# Patient Record
Sex: MALE | Race: OTHER | NOT HISPANIC OR LATINO | URBAN - METROPOLITAN AREA
[De-identification: names, ages, dates, MRNs, and addresses within clinical notes are randomized per-mention and may not be internally consistent; named-entity substitution may affect disease eponyms.]

---

## 2018-01-28 ENCOUNTER — EMERGENCY (EMERGENCY)
Facility: HOSPITAL | Age: 63
LOS: 1 days | Discharge: HOME | End: 2018-01-28
Attending: EMERGENCY MEDICINE
Payer: MEDICAID

## 2018-01-28 VITALS
DIASTOLIC BLOOD PRESSURE: 82 MMHG | SYSTOLIC BLOOD PRESSURE: 200 MMHG | HEART RATE: 93 BPM | TEMPERATURE: 98 F | RESPIRATION RATE: 18 BRPM | OXYGEN SATURATION: 100 %

## 2018-01-28 VITALS — DIASTOLIC BLOOD PRESSURE: 71 MMHG | SYSTOLIC BLOOD PRESSURE: 191 MMHG

## 2018-01-28 PROCEDURE — 99284 EMERGENCY DEPT VISIT MOD MDM: CPT

## 2018-01-28 PROCEDURE — 90471 IMMUNIZATION ADMIN: CPT

## 2018-01-28 PROCEDURE — 90715 TDAP VACCINE 7 YRS/> IM: CPT

## 2018-01-28 PROCEDURE — 99283 EMERGENCY DEPT VISIT LOW MDM: CPT | Mod: 25

## 2018-01-28 RX ORDER — TETANUS TOXOID, REDUCED DIPHTHERIA TOXOID AND ACELLULAR PERTUSSIS VACCINE, ADSORBED 5; 2.5; 8; 8; 2.5 [IU]/.5ML; [IU]/.5ML; UG/.5ML; UG/.5ML; UG/.5ML
0.5 SUSPENSION INTRAMUSCULAR ONCE
Qty: 0 | Refills: 0 | Status: COMPLETED | OUTPATIENT
Start: 2018-01-28 | End: 2018-01-28

## 2018-01-28 RX ORDER — BACITRACIN ZINC 500 UNIT/G
1 OINTMENT IN PACKET (EA) TOPICAL ONCE
Qty: 0 | Refills: 0 | Status: COMPLETED | OUTPATIENT
Start: 2018-01-28 | End: 2018-01-28

## 2018-01-28 RX ADMIN — Medication 1 APPLICATION(S): at 19:58

## 2018-01-28 RX ADMIN — TETANUS TOXOID, REDUCED DIPHTHERIA TOXOID AND ACELLULAR PERTUSSIS VACCINE, ADSORBED 0.5 MILLILITER(S): 5; 2.5; 8; 8; 2.5 SUSPENSION INTRAMUSCULAR at 19:58

## 2018-01-28 RX ADMIN — Medication 1 TABLET(S): at 19:58

## 2018-01-28 NOTE — ED PROVIDER NOTE - ATTENDING CONTRIBUTION TO CARE
63 y/o M pt presents with dog bite superficial wound. Well-appearing, neurovascularly intact, no signs of infection. Plan for tetanus immunization, prophylactic Abx, and d/c home. No need for rabies vaccination. Pt will f/u with PMD in x2-3 days.

## 2018-01-28 NOTE — ED PROVIDER NOTE - PROGRESS NOTE DETAILS
Dog bite/abrasion to L posterior calf. Will update tetanus immunization. No need for rabies vaccination. Given history of DM and LE affected will give 5 day course of prophylactic Augmentin. Will need to follow up with PMD in 2-3 days. Pt is well appearing walking with steady gait, stable for discharge and follow up without fail with medical doctor. I had a detailed discussion with the patient and/or guardian regarding the historical points, exam findings, and any diagnostic results supporting the discharge diagnosis. Pt educated on care and need for follow up. Strict return instructions and red flag signs and symptoms discussed with patient. Questions answered. Pt shows understanding of discharge information and agrees to follow.

## 2018-01-28 NOTE — ED PROVIDER NOTE - OBJECTIVE STATEMENT
63 y/o M pt with PMHx of HTN and DM and no significant PSHx presents with son to ED c/o mild pain to the L posterior calf s/p dog bite earlier today. Pt states he was bitten on the L calf by his neighbor's [employer] Retriever; pt states neighbor's dog is well-appearing, with neighbors informing the pt that their dog is UTD with its vaccinations. Pt states the dog bite was unprovoked. In ED, pt notes bleeding is controlled, however pt is still c/o mild pain to the L posterior calf. Pt denies any other complaints. Pt reports unknown last tetanus vaccination. NKDA.

## 2019-02-21 PROBLEM — Z00.00 ENCOUNTER FOR PREVENTIVE HEALTH EXAMINATION: Noted: 2019-02-21

## 2019-02-27 ENCOUNTER — APPOINTMENT (OUTPATIENT)
Dept: THORACIC SURGERY | Facility: CLINIC | Age: 64
End: 2019-02-27
Payer: MEDICAID

## 2019-02-27 VITALS
BODY MASS INDEX: 22.13 KG/M2 | HEIGHT: 67 IN | DIASTOLIC BLOOD PRESSURE: 73 MMHG | OXYGEN SATURATION: 100 % | HEART RATE: 64 BPM | SYSTOLIC BLOOD PRESSURE: 123 MMHG | TEMPERATURE: 97.8 F | WEIGHT: 141 LBS

## 2019-02-27 DIAGNOSIS — I77.9 DISORDER OF ARTERIES AND ARTERIOLES, UNSPECIFIED: ICD-10-CM

## 2019-02-27 DIAGNOSIS — Z78.9 OTHER SPECIFIED HEALTH STATUS: ICD-10-CM

## 2019-02-27 PROCEDURE — 99204 OFFICE O/P NEW MOD 45 MIN: CPT

## 2019-02-27 NOTE — ASSESSMENT
[FreeTextEntry1] : 63 year old male with Right lung consolidation on CT of January 10th 2019.  He has since completed a course of antibiotics.  I recommend a repeat non-contrast chest CT.  RTO after CT.\par \par Written by  Perla Sanchez PA-C acting as a scribe for Az Mijares MD. The documentation recorded by the scribe accurately reflects the service I personally performed and the decisions made by me, AZ MIJARES MD.\par

## 2019-02-27 NOTE — HISTORY OF PRESENT ILLNESS
[Hypertension] : Hypertension [FreeTextEntry1] : 63 year old male, never smoker, with h/o HTN, stents x4 (most recent 2016), on Brilenta and ASA, referred by Dr. Uribe for evaluation of right lung consolidation.  Patient reports that he had cough 2 months ago, was started on ABX, had a CXR which showed an abnormality then a CT scan that confirmed a RLL consolidation.  Patient has since been treated with ABX.  He denies hemoptysis.  He denies unexplained weight loss.  Medial[employer] InterpretThe [employer] was used for translation.\par  [Diabetes Mellitus] : no Diabetes Melllitus

## 2019-02-27 NOTE — DATA REVIEWED
[FreeTextEntry1] : CT Chest done at [facility] 1/10/19  \par Report scanned: Patchy airspace consolidation Right lower lobe may reflect pneumonia in the appropriate clinical setting.  Follow up resolution s suggested in 4-6 weeks post treatment to exclude underlying mass.\par \par I also reviewed the images of this CT through [facility] Portal

## 2019-02-27 NOTE — PHYSICAL EXAM
[General Appearance - Alert] : alert [General Appearance - In No Acute Distress] : in no acute distress [Sclera] : the sclera and conjunctiva were normal [Extraocular Movements] : extraocular movements were intact [Outer Ear] : the ears and nose were normal in appearance [Neck Appearance] : the appearance of the neck was normal [Neck Cervical Mass (___cm)] : no neck mass was observed [] : no respiratory distress [Auscultation Breath Sounds / Voice Sounds] : lungs were clear to auscultation bilaterally [Heart Rate And Rhythm] : heart rate was normal and rhythm regular [Heart Sounds] : normal S1 and S2 [Examination Of The Chest] : the chest was normal in appearance [2+] : left 2+ [Fingers] :  capillary refill of the fingers was normal [Left Fingers] :  capillary refill of the left fingers was normal [Abdomen Soft] : soft [Abdomen Tenderness] : non-tender [Cervical Lymph Nodes Enlarged Posterior Bilaterally] : posterior cervical [Cervical Lymph Nodes Enlarged Anterior Bilaterally] : anterior cervical [Supraclavicular Lymph Nodes Enlarged Bilaterally] : supraclavicular [No CVA Tenderness] : no ~M costovertebral angle tenderness [Abnormal Walk] : normal gait [Skin Color & Pigmentation] : normal skin color and pigmentation [No Focal Deficits] : no focal deficits [Oriented To Time, Place, And Person] : oriented to person, place, and time

## 2019-02-27 NOTE — REVIEW OF SYSTEMS
[Fever] : no fever [Chills] : no chills [Chest Pain] : no chest pain [Palpitations] : no palpitations [Shortness Of Breath] : no shortness of breath [Cough] : cough [Abdominal Pain] : no abdominal pain [Negative] : Endocrine [FreeTextEntry6] : improving

## 2019-03-20 ENCOUNTER — APPOINTMENT (OUTPATIENT)
Dept: THORACIC SURGERY | Facility: CLINIC | Age: 64
End: 2019-03-20
Payer: MEDICARE

## 2019-03-20 VITALS
WEIGHT: 141 LBS | SYSTOLIC BLOOD PRESSURE: 126 MMHG | BODY MASS INDEX: 22.13 KG/M2 | HEIGHT: 67 IN | TEMPERATURE: 98.6 F | HEART RATE: 66 BPM | DIASTOLIC BLOOD PRESSURE: 71 MMHG

## 2019-03-20 DIAGNOSIS — Z82.49 FAMILY HISTORY OF ISCHEMIC HEART DISEASE AND OTHER DISEASES OF THE CIRCULATORY SYSTEM: ICD-10-CM

## 2019-03-20 DIAGNOSIS — Z86.39 PERSONAL HISTORY OF OTHER ENDOCRINE, NUTRITIONAL AND METABOLIC DISEASE: ICD-10-CM

## 2019-03-20 DIAGNOSIS — J45.909 UNSPECIFIED ASTHMA, UNCOMPLICATED: ICD-10-CM

## 2019-03-20 PROCEDURE — 99215 OFFICE O/P EST HI 40 MIN: CPT

## 2019-03-20 NOTE — ADDENDUM
[FreeTextEntry1] : Discussed case with MIRLANDE Siddiqi at Dr. Harper's office who stated patient's last PCI was May 21, 2018. She stated that we would be able to hold Brilinta 5-7 days before the procedure - lung biopsy and resume in 1-2 days following the procedure barring no complications.

## 2019-03-20 NOTE — REASON FOR VISIT
[Follow-Up: _____] : a [unfilled] follow-up visit [FreeTextEntry1] : He was seen by us 3 weeks ago, and a repeat CT without contrast was recommended to evaluate any change in mass post ABX.

## 2019-03-20 NOTE — PHYSICAL EXAM
[Sclera] : the sclera and conjunctiva were normal [Extraocular Movements] : extraocular movements were intact [Neck Appearance] : the appearance of the neck was normal [Neck Cervical Mass (___cm)] : no neck mass was observed [] : no respiratory distress [Auscultation Breath Sounds / Voice Sounds] : lungs were clear to auscultation bilaterally [Heart Rate And Rhythm] : heart rate was normal and rhythm regular [Heart Sounds] : normal S1 and S2 [Examination Of The Chest] : the chest was normal in appearance [2+] : left 2+ [Abdomen Soft] : soft [Abdomen Tenderness] : non-tender [Cervical Lymph Nodes Enlarged Posterior Bilaterally] : posterior cervical [Supraclavicular Lymph Nodes Enlarged Bilaterally] : supraclavicular [Cervical Lymph Nodes Enlarged Anterior Bilaterally] : anterior cervical [Abnormal Walk] : normal gait [No CVA Tenderness] : no ~M costovertebral angle tenderness [Skin Color & Pigmentation] : normal skin color and pigmentation [No Focal Deficits] : no focal deficits [Oriented To Time, Place, And Person] : oriented to person, place, and time [General Appearance - Alert] : alert [General Appearance - In No Acute Distress] : in no acute distress [Outer Ear] : the ears and nose were normal in appearance [Fingers] :  capillary refill of the fingers was normal [Left Fingers] :  capillary refill of the left fingers was normal

## 2019-03-20 NOTE — REVIEW OF SYSTEMS
[Cough] : cough [Negative] : Psychiatric [Fever] : no fever [Chills] : no chills [Palpitations] : no palpitations [Chest Pain] : no chest pain [Shortness Of Breath] : no shortness of breath [FreeTextEntry6] : improving [Abdominal Pain] : no abdominal pain

## 2019-03-20 NOTE — DATA REVIEWED
[FreeTextEntry1] : CT Chest done at [facility] 1/10/19  \par Report scanned: Patchy airspace consolidation Right lower lobe may reflect pneumonia in the appropriate clinical setting.  Follow up resolution s suggested in 4-6 weeks post treatment to exclude underlying mass.\par \par CT done March 2019 at [facility] shows persistent mass, 4cm.

## 2019-03-20 NOTE — ASSESSMENT
[FreeTextEntry1] : Persistent RLL mass.  Recommend IR biopsy.  Patient is on Brilenta and ASA for h/o stent 2016.  He will need medical and cardiac clearance as well as PFT's. I called his cardiologist and left message for call back.  Also called Dr. Uribe.  Awaiting call backs.   Also left message for Dr. Ramos (IR).  Will contact patient when we hear from Dr. Ramos.  If Dr. Ramos is agreeable, we will schedule IR bx.  Patient is scheduled to see Dr. Uribe this afternoon, I gave him the clearance form for Dr. Uribe to complete.\par \par Cardiologist is Dr. Harper: [phone]\par PMD:  Dr. Uribe  [phone]\par \par Best contact for patient is:[phone]\par

## 2019-03-20 NOTE — HISTORY OF PRESENT ILLNESS
[Dyslipidemia] : Dyslipidemia [Hypertension] : Hypertension [FreeTextEntry1] : 63 year old male, never smoker, with h/o HTN, stents x4 (most recent 2016), on Brilenta and ASA, referred by Dr. Uribe for evaluation of right lung consolidation. We saw patient 3 weeks ago and recommended a repeat CT scan to evaluate any change in RLL consolidation post ABX treatment.  He presents today with no new complaints, he had hi repeat CT at [facility] and is here to review the results, and plan.\par  [Diabetes Mellitus] : no Diabetes Melllitus

## 2019-03-28 ENCOUNTER — OUTPATIENT (OUTPATIENT)
Dept: OUTPATIENT SERVICES | Facility: HOSPITAL | Age: 64
LOS: 1 days | End: 2019-03-28

## 2019-03-28 DIAGNOSIS — R91.8 OTHER NONSPECIFIC ABNORMAL FINDING OF LUNG FIELD: ICD-10-CM

## 2019-03-28 PROBLEM — E11.9 TYPE 2 DIABETES MELLITUS WITHOUT COMPLICATIONS: Chronic | Status: ACTIVE | Noted: 2018-01-28

## 2019-03-28 PROBLEM — I10 ESSENTIAL (PRIMARY) HYPERTENSION: Chronic | Status: ACTIVE | Noted: 2018-01-28

## 2019-03-29 PROBLEM — Z00.00 ENCOUNTER FOR PREVENTIVE HEALTH EXAMINATION: Noted: 2019-03-29

## 2019-04-01 ENCOUNTER — APPOINTMENT (OUTPATIENT)
Dept: INTERVENTIONAL RADIOLOGY/VASCULAR | Facility: HOSPITAL | Age: 64
End: 2019-04-01

## 2019-05-20 ENCOUNTER — APPOINTMENT (OUTPATIENT)
Dept: INTERVENTIONAL RADIOLOGY/VASCULAR | Facility: HOSPITAL | Age: 64
End: 2019-05-20
Payer: MEDICARE

## 2019-05-20 ENCOUNTER — OUTPATIENT (OUTPATIENT)
Dept: OUTPATIENT SERVICES | Facility: HOSPITAL | Age: 64
LOS: 1 days | End: 2019-05-20

## 2019-05-20 DIAGNOSIS — R91.8 OTHER NONSPECIFIC ABNORMAL FINDING OF LUNG FIELD: ICD-10-CM

## 2019-05-20 PROCEDURE — 99204 OFFICE O/P NEW MOD 45 MIN: CPT

## 2019-05-20 NOTE — REASON FOR VISIT
[Consultation] : a consultation visit [[employer] ] : provided by [employer]   [FreeTextEntry1] : [id number]

## 2019-05-20 NOTE — REVIEW OF SYSTEMS
[As noted in HPI] : as noted in HPI [As Noted in HPI] : as noted in HPI [Negative] : Neurological [Chest Pain] : no chest pain [Shortness Of Breath] : no shortness of breath [Cough] : no cough

## 2019-05-20 NOTE — ASSESSMENT
[FreeTextEntry1] : 64 yo M who presents for consultation for Right lung mass biopsy. Patient has a persistent RLL pulmonary mass that has failed antibiotic therapy. Patient has CAD s/p stents x4 (last stent over a year ago), and last cardiac cath with angioplasty in April 2019. Patient is currently on ASA, Brilinta, and Cilostazol, which he states he was told by his doctor is okay to stop if needed prior to biopsy. The procedure (CT guided biopsy of right lung mass) was discussed in detail with the patient using the telephone  service. The benefits, alternatives, and risks of the procedure, including but not limited to risks of bleeding and pneumothorax which may require chest tube placement was also discussed with the patient. All questions were answered. Patient demonstrates understanding and states that he will schedule the appointment after he "analyzes" how he should proceed. Patient was told that should he decide to proceed with the procedure, he would have to hold the above mentioned medications accordingly, if cleared by cardiologist.

## 2019-05-20 NOTE — HISTORY OF PRESENT ILLNESS
[Stable] : stable [None] : No exacerbating factors are noted [FreeTextEntry1] : Mr. Bear is a 63 year old male who presents for consultation for Right lung mass biopsy. Patient has history of Asthma, HTN, cardiac stents x 4 on Brilinta, ASA, and Cilostazol. Last stent placed over a year ago. However, patient reports having cardiac angiogram with angioplasty at [facility] this year in April (confirmed with cardiology NP). Patient presented to PCP several months ago with cough, at which time imaging showed RLL consolidation. Patient had a course of antibiotics, but repeat imaging failed to demonstrate resolution of the opacity. Patient presented to thoracic surgery service, after which he was referred to IR for percutaneous biopsy. Patient reports previous history of right elbow and left neck lesions biopsy, which yielded benign results. Patient denies chest pain, SOB, cough, night sweats, fever, chills at this time. Denies nausea, vomiting, diarrhea, or recent weight loss.  \par \par Cardiologist Dr. Harper [phone]\par  [de-identified] : see report

## 2019-05-20 NOTE — PHYSICAL EXAM
[Alert] : alert [No Acute Distress] : no acute distress [No Proptosis] : no proptosis [Normal Hearing] : hearing was normal [No Respiratory Distress] : no respiratory distress [Normal Rate and Effort] : normal respiratory rhythm and effort [Normal Gait] : normal gait [Oriented x3] : oriented to person, place, and time

## 2020-03-10 ENCOUNTER — APPOINTMENT (OUTPATIENT)
Dept: THORACIC SURGERY | Facility: CLINIC | Age: 65
End: 2020-03-10

## 2022-01-26 ENCOUNTER — INPATIENT (INPATIENT)
Facility: HOSPITAL | Age: 67
LOS: 1 days | Discharge: HOME | DRG: 65 | End: 2022-01-28
Attending: INTERNAL MEDICINE | Admitting: INTERNAL MEDICINE
Payer: COMMERCIAL

## 2022-01-26 VITALS
SYSTOLIC BLOOD PRESSURE: 206 MMHG | HEART RATE: 70 BPM | OXYGEN SATURATION: 100 % | WEIGHT: 180.78 LBS | HEIGHT: 65 IN | DIASTOLIC BLOOD PRESSURE: 83 MMHG | RESPIRATION RATE: 20 BRPM | TEMPERATURE: 98 F

## 2022-01-26 DIAGNOSIS — E78.5 HYPERLIPIDEMIA, UNSPECIFIED: ICD-10-CM

## 2022-01-26 DIAGNOSIS — N17.9 ACUTE KIDNEY FAILURE, UNSPECIFIED: ICD-10-CM

## 2022-01-26 DIAGNOSIS — I25.10 ATHEROSCLEROTIC HEART DISEASE OF NATIVE CORONARY ARTERY WITHOUT ANGINA PECTORIS: ICD-10-CM

## 2022-01-26 DIAGNOSIS — I10 ESSENTIAL (PRIMARY) HYPERTENSION: ICD-10-CM

## 2022-01-26 DIAGNOSIS — Z29.9 ENCOUNTER FOR PROPHYLACTIC MEASURES, UNSPECIFIED: ICD-10-CM

## 2022-01-26 DIAGNOSIS — I63.89 OTHER CEREBRAL INFARCTION: ICD-10-CM

## 2022-01-26 DIAGNOSIS — I63.9 CEREBRAL INFARCTION, UNSPECIFIED: ICD-10-CM

## 2022-01-26 LAB
ACETONE SERPL-MCNC: NEGATIVE — SIGNIFICANT CHANGE UP
ALBUMIN SERPL ELPH-MCNC: 3 G/DL — LOW (ref 3.5–5)
ALP SERPL-CCNC: 120 U/L — SIGNIFICANT CHANGE UP (ref 40–120)
ALT FLD-CCNC: 14 U/L DA — SIGNIFICANT CHANGE UP (ref 10–60)
ANION GAP SERPL CALC-SCNC: 5 MMOL/L — SIGNIFICANT CHANGE UP (ref 5–17)
APTT BLD: 34.7 SEC — SIGNIFICANT CHANGE UP (ref 27.5–35.5)
AST SERPL-CCNC: 11 U/L — SIGNIFICANT CHANGE UP (ref 10–40)
BASOPHILS # BLD AUTO: 0.04 K/UL — SIGNIFICANT CHANGE UP (ref 0–0.2)
BASOPHILS NFR BLD AUTO: 0.4 % — SIGNIFICANT CHANGE UP (ref 0–2)
BILIRUB SERPL-MCNC: 0.4 MG/DL — SIGNIFICANT CHANGE UP (ref 0.2–1.2)
BLD GP AB SCN SERPL QL: SIGNIFICANT CHANGE UP
BUN SERPL-MCNC: 21 MG/DL — HIGH (ref 7–18)
CALCIUM SERPL-MCNC: 8.9 MG/DL — SIGNIFICANT CHANGE UP (ref 8.4–10.5)
CHLORIDE SERPL-SCNC: 101 MMOL/L — SIGNIFICANT CHANGE UP (ref 96–108)
CHOLEST SERPL-MCNC: 152 MG/DL — SIGNIFICANT CHANGE UP
CO2 SERPL-SCNC: 30 MMOL/L — SIGNIFICANT CHANGE UP (ref 22–31)
CREAT SERPL-MCNC: 1.31 MG/DL — HIGH (ref 0.5–1.3)
EOSINOPHIL # BLD AUTO: 0.3 K/UL — SIGNIFICANT CHANGE UP (ref 0–0.5)
EOSINOPHIL NFR BLD AUTO: 2.9 % — SIGNIFICANT CHANGE UP (ref 0–6)
GLUCOSE BLDC GLUCOMTR-MCNC: 337 MG/DL — HIGH (ref 70–99)
GLUCOSE SERPL-MCNC: 280 MG/DL — HIGH (ref 70–99)
HCT VFR BLD CALC: 40.7 % — SIGNIFICANT CHANGE UP (ref 39–50)
HDLC SERPL-MCNC: 37 MG/DL — LOW
HGB BLD-MCNC: 14.3 G/DL — SIGNIFICANT CHANGE UP (ref 13–17)
IMM GRANULOCYTES NFR BLD AUTO: 0.4 % — SIGNIFICANT CHANGE UP (ref 0–1.5)
INR BLD: 1.01 RATIO — SIGNIFICANT CHANGE UP (ref 0.88–1.16)
LACTATE SERPL-SCNC: 1.8 MMOL/L — SIGNIFICANT CHANGE UP (ref 0.7–2)
LIPID PNL WITH DIRECT LDL SERPL: 61 MG/DL — SIGNIFICANT CHANGE UP
LYMPHOCYTES # BLD AUTO: 1.63 K/UL — SIGNIFICANT CHANGE UP (ref 1–3.3)
LYMPHOCYTES # BLD AUTO: 15.7 % — SIGNIFICANT CHANGE UP (ref 13–44)
MAGNESIUM SERPL-MCNC: 2.2 MG/DL — SIGNIFICANT CHANGE UP (ref 1.6–2.6)
MCHC RBC-ENTMCNC: 29.7 PG — SIGNIFICANT CHANGE UP (ref 27–34)
MCHC RBC-ENTMCNC: 35.1 GM/DL — SIGNIFICANT CHANGE UP (ref 32–36)
MCV RBC AUTO: 84.4 FL — SIGNIFICANT CHANGE UP (ref 80–100)
MONOCYTES # BLD AUTO: 0.31 K/UL — SIGNIFICANT CHANGE UP (ref 0–0.9)
MONOCYTES NFR BLD AUTO: 3 % — SIGNIFICANT CHANGE UP (ref 2–14)
NEUTROPHILS # BLD AUTO: 8.08 K/UL — HIGH (ref 1.8–7.4)
NEUTROPHILS NFR BLD AUTO: 77.6 % — HIGH (ref 43–77)
NON HDL CHOLESTEROL: 115 MG/DL — SIGNIFICANT CHANGE UP
NRBC # BLD: 0 /100 WBCS — SIGNIFICANT CHANGE UP (ref 0–0)
PLATELET # BLD AUTO: 219 K/UL — SIGNIFICANT CHANGE UP (ref 150–400)
POTASSIUM SERPL-MCNC: 4 MMOL/L — SIGNIFICANT CHANGE UP (ref 3.5–5.3)
POTASSIUM SERPL-SCNC: 4 MMOL/L — SIGNIFICANT CHANGE UP (ref 3.5–5.3)
PROT SERPL-MCNC: 7 G/DL — SIGNIFICANT CHANGE UP (ref 6–8.3)
PROTHROM AB SERPL-ACNC: 12 SEC — SIGNIFICANT CHANGE UP (ref 10.6–13.6)
RBC # BLD: 4.82 M/UL — SIGNIFICANT CHANGE UP (ref 4.2–5.8)
RBC # FLD: 11.6 % — SIGNIFICANT CHANGE UP (ref 10.3–14.5)
SARS-COV-2 RNA SPEC QL NAA+PROBE: SIGNIFICANT CHANGE UP
SODIUM SERPL-SCNC: 136 MMOL/L — SIGNIFICANT CHANGE UP (ref 135–145)
TRIGL SERPL-MCNC: 268 MG/DL — HIGH
TROPONIN I, HIGH SENSITIVITY RESULT: 6.4 NG/L — SIGNIFICANT CHANGE UP
WBC # BLD: 10.4 K/UL — SIGNIFICANT CHANGE UP (ref 3.8–10.5)
WBC # FLD AUTO: 10.4 K/UL — SIGNIFICANT CHANGE UP (ref 3.8–10.5)

## 2022-01-26 PROCEDURE — 99223 1ST HOSP IP/OBS HIGH 75: CPT

## 2022-01-26 PROCEDURE — 93010 ELECTROCARDIOGRAM REPORT: CPT

## 2022-01-26 PROCEDURE — 99291 CRITICAL CARE FIRST HOUR: CPT

## 2022-01-26 PROCEDURE — 0042T: CPT

## 2022-01-26 PROCEDURE — 70498 CT ANGIOGRAPHY NECK: CPT | Mod: 26,MA

## 2022-01-26 PROCEDURE — 71045 X-RAY EXAM CHEST 1 VIEW: CPT | Mod: 26

## 2022-01-26 PROCEDURE — 70496 CT ANGIOGRAPHY HEAD: CPT | Mod: 26,MA

## 2022-01-26 RX ORDER — CLOPIDOGREL BISULFATE 75 MG/1
75 TABLET, FILM COATED ORAL DAILY
Refills: 0 | Status: DISCONTINUED | OUTPATIENT
Start: 2022-01-26 | End: 2022-01-28

## 2022-01-26 RX ORDER — POLYETHYLENE GLYCOL 3350 17 G/17G
17 POWDER, FOR SOLUTION ORAL DAILY
Refills: 0 | Status: DISCONTINUED | OUTPATIENT
Start: 2022-01-26 | End: 2022-01-28

## 2022-01-26 RX ORDER — INSULIN GLARGINE 100 [IU]/ML
15 INJECTION, SOLUTION SUBCUTANEOUS AT BEDTIME
Refills: 0 | Status: DISCONTINUED | OUTPATIENT
Start: 2022-01-26 | End: 2022-01-27

## 2022-01-26 RX ORDER — ASPIRIN/CALCIUM CARB/MAGNESIUM 324 MG
81 TABLET ORAL DAILY
Refills: 0 | Status: DISCONTINUED | OUTPATIENT
Start: 2022-01-26 | End: 2022-01-28

## 2022-01-26 RX ORDER — ASPIRIN/CALCIUM CARB/MAGNESIUM 324 MG
325 TABLET ORAL ONCE
Refills: 0 | Status: COMPLETED | OUTPATIENT
Start: 2022-01-26 | End: 2022-01-26

## 2022-01-26 RX ORDER — ATORVASTATIN CALCIUM 80 MG/1
40 TABLET, FILM COATED ORAL ONCE
Refills: 0 | Status: COMPLETED | OUTPATIENT
Start: 2022-01-26 | End: 2022-01-26

## 2022-01-26 RX ORDER — SENNA PLUS 8.6 MG/1
2 TABLET ORAL AT BEDTIME
Refills: 0 | Status: DISCONTINUED | OUTPATIENT
Start: 2022-01-26 | End: 2022-01-28

## 2022-01-26 RX ORDER — SODIUM CHLORIDE 9 MG/ML
1000 INJECTION INTRAMUSCULAR; INTRAVENOUS; SUBCUTANEOUS
Refills: 0 | Status: DISCONTINUED | OUTPATIENT
Start: 2022-01-26 | End: 2022-01-28

## 2022-01-26 RX ORDER — ATORVASTATIN CALCIUM 80 MG/1
80 TABLET, FILM COATED ORAL AT BEDTIME
Refills: 0 | Status: DISCONTINUED | OUTPATIENT
Start: 2022-01-26 | End: 2022-01-27

## 2022-01-26 RX ORDER — INSULIN LISPRO 100/ML
VIAL (ML) SUBCUTANEOUS
Refills: 0 | Status: DISCONTINUED | OUTPATIENT
Start: 2022-01-26 | End: 2022-01-28

## 2022-01-26 RX ORDER — ENOXAPARIN SODIUM 100 MG/ML
40 INJECTION SUBCUTANEOUS AT BEDTIME
Refills: 0 | Status: DISCONTINUED | OUTPATIENT
Start: 2022-01-26 | End: 2022-01-28

## 2022-01-26 RX ORDER — ATORVASTATIN CALCIUM 80 MG/1
1 TABLET, FILM COATED ORAL
Qty: 0 | Refills: 0 | DISCHARGE

## 2022-01-26 RX ORDER — METFORMIN HYDROCHLORIDE 850 MG/1
1 TABLET ORAL
Qty: 0 | Refills: 0 | DISCHARGE

## 2022-01-26 RX ADMIN — ATORVASTATIN CALCIUM 80 MILLIGRAM(S): 80 TABLET, FILM COATED ORAL at 23:01

## 2022-01-26 RX ADMIN — ATORVASTATIN CALCIUM 40 MILLIGRAM(S): 80 TABLET, FILM COATED ORAL at 16:13

## 2022-01-26 RX ADMIN — ENOXAPARIN SODIUM 40 MILLIGRAM(S): 100 INJECTION SUBCUTANEOUS at 23:01

## 2022-01-26 RX ADMIN — Medication 325 MILLIGRAM(S): at 16:13

## 2022-01-26 NOTE — H&P ADULT - HISTORY OF PRESENT ILLNESS
The patient is a 67 yo M with PMH of IDDM, HTN, HLD, CAD presenting with left sided weakness since 10:30am this morning. The patient felt weakness last night around 10:00 pm, went to sleep around midnight and woke-up at 10:30am. Since waking he noticed difficulty ambulating due to right leg weakness. He also noticed right arm weakness. Around 1:30pm his son noticed slurred speech prompting him to come to the ED. Patient has history of angiogram more than 10 years ago but denies stent. Patient denies headache, LOC, chest pain, shortness of breath, LE swelling.  In ED: CTA negative for infarct. Shows 22 mL of active ischemia in anterior left temporal lobe. Tele stroke consulted. Patient not a candidate for acute intervention. Out of window for tpa.   The patient is a 65 yo M with PMH of IDDM, HTN, HLD, CAD presenting with left sided weakness since 10:30am this morning. The patient felt weakness last night around 10:00 pm, went to sleep around midnight and woke-up at 10:30am. Since waking he noticed difficulty ambulating due to right leg weakness. He also noticed right arm weakness. Around 1:30pm his son noticed slurred speech prompting him to come to the ED. Patient has history of angiogram more than 10 years ago but denies stent. Patient denies headache, LOC, chest pain, shortness of breath, LE swelling.    In ED: s/p 325 ASA and 40mg lipitor. CTA negative for infarct. Shows 22 mL of active ischemia in anterior left temporal lobe. Tele stroke consulted. Patient not a candidate for acute intervention. Out of window for tpa.   The patient is a 65 yo M with PMH of IDDM, HTN, HLD, CAD presenting with left sided weakness since 10:30am this morning. The patient felt weakness last night around 10:00 pm, went to sleep around midnight and woke-up at 10:30am. Since waking he noticed difficulty ambulating due to right leg weakness. He also noticed right arm weakness. Around 1:30pm his son noticed slurred speech prompting him to come to the ED. Patient has history of angiogram more than 10 years ago but denies stent. Patient denies headache, LOC, chest pain, shortness of breath, LE swelling.    In ED: s/p 325 ASA and 40mg lipitor. CTA shows 22 mL of active ischemia in anterior left temporal lobe. Tele stroke consulted. Patient not a candidate for acute intervention. Out of window for tpa.   The patient is a 67 yo M with PMH of IDDM, HTN, HLD, CAD presenting with left sided weakness since 10:30am this morning. The patient felt malaise and general weakness last night around 10:00 pm, went to sleep around midnight and woke-up at 10:30am. Since waking he noticed difficulty ambulating due to right leg weakness. He also noticed right arm weakness. Around 1:30pm his son noticed slurred speech prompting him to come to the ED. Patient has history of angiogram more than 10 years ago but denies stent. Patient denies headache, LOC, chest pain, shortness of breath, LE swelling.    In ED: s/p 325 ASA and 40mg lipitor. CTA shows 22 mL of active ischemia in anterior left temporal lobe. Tele stroke consulted. Patient not a candidate for acute intervention. Out of window for tpa.   The patient is a 65 yo M with PMH of IDDM, HTN, HLD, CAD presenting with right sided weakness since 10:30am this morning. The patient felt malaise and general weakness last night around 10:00 pm, went to sleep around midnight and woke-up at 10:30am. Since waking he noticed difficulty ambulating due to right leg weakness. He also noticed right arm weakness. Around 1:30pm his son noticed slurred speech prompting him to come to the ED. Patient has history of angiogram more than 10 years ago but denies stent. Patient denies headache, LOC, chest pain, shortness of breath, LE swelling.    In ED: s/p 325 ASA and 40mg lipitor. CTA shows 22 mL of active ischemia in anterior left temporal lobe. Tele stroke consulted. Patient not a candidate for acute intervention. Out of window for tpa.

## 2022-01-26 NOTE — ED ADULT NURSE NOTE - OBJECTIVE STATEMENT
Patient presents to ED with c/o weakness since last night. patient is A&Ox4, able to follow commands noted to have right side facial droop with weakness on right side as documented on stroke flowsheet.

## 2022-01-26 NOTE — H&P ADULT - NSHPPHYSICALEXAM_GEN_ALL_CORE
Vital Signs Last 24 Hrs  T(C): 36.6 (26 Jan 2022 19:00), Max: 36.8 (26 Jan 2022 15:20)  T(F): 97.8 (26 Jan 2022 19:00), Max: 98.2 (26 Jan 2022 15:20)  HR: 65 (26 Jan 2022 19:00) (61 - 78)  BP: 178/58 (26 Jan 2022 19:00) (163/73 - 206/83)  BP(mean): --  RR: 18 (26 Jan 2022 19:00) (17 - 20)  SpO2: 100% (26 Jan 2022 19:00) (99% - 100%)    GENERAL: NAD, speaks in full sentences, no signs of respiratory distress  HEAD:  Atraumatic, Normocephalic  EYES: EOMI, PERRLA, conjunctiva and sclera clear  NECK: Supple, No JVD  CHEST/LUNG: Clear to auscultation bilaterally; No wheeze; No crackles; No accessory muscles used  HEART: Regular rate and rhythm; No murmurs;   ABDOMEN: Soft, Nontender, Nondistended; Bowel sounds present; No guarding  EXTREMITIES:  2+ Peripheral Pulses, No cyanosis or edema  PSYCH:  Normal Affect  NEUROLOGY: non-focal, AAO X 3. Strength is 3/5 R LE and UE. 5/5 on left. Right sided facial droop.  SKIN: No rashes or lesions

## 2022-01-26 NOTE — H&P ADULT - PROBLEM SELECTOR PLAN 1
Patient presented with right sided weakness since 10:30am after waking. Went to be at midnight.  CTA shows left temporal lobe ischemia, no core infarct. Neg for hemorrhage.  Tele stroke: not a candidate for acute intervention, out of window for tpa  NIHSS: 4  Passed bed-side dysphagia screening    ASA, plavix, statin  MRI of brain  Neuro consult, Dr. Ash  Hold anti-HTN meds for permissive hypertension  Fall precautions  SS consult  Physical therapy consult Patient presented with right sided weakness since 10:30am after waking. Went to be at midnight.  CTA shows left temporal lobe ischemia, no core infarct. Neg for hemorrhage.  Tele stroke: not a candidate for acute intervention, out of window for tpa  NIHSS: 4  Passed bed-side dysphagia screening    ASA, plavix, statin  Telemetry  MRI of brain  Frequent neuro checks  Neuro consult, Dr. Ash  Hold anti-HTN meds for permissive hypertension  Fall precautions  SS consult  Physical therapy consult Patient presented with right sided weakness since 10:30am after waking. Went to be at midnight.  CT: microvascular disease, no acute hemorrhage or midline shift. Possible hypodensities in the [place]erald.  CTA shows left temporal lobe ischemia, no core infarct. Neg for hemorrhage.  Tele stroke: not a candidate for acute intervention, out of window for tpa  NIHSS: 4  Passed bed-side dysphagia screening  Trop -ve. EKG NSR    ASA, plavix, statin  Telemetry  MRI of brain  Frequent neuro checks  Neuro consult, Dr. Ash  Hold anti-HTN meds for permissive hypertension  Fall precautions  SS consult  Physical therapy consult Patient presented with right sided weakness since 10:30am after waking. Went to be at midnight.  CT: microvascular disease, no acute hemorrhage or midline shift. Possible hypodensities in the [place].  CTA shows left temporal lobe ischemia, no core infarct. Neg for hemorrhage.  Tele stroke: not a candidate for acute intervention, out of window for tpa  NIHSS: 4  Passed bed-side dysphagia screening  Trop -ve. EKG NSR    ASA, plavix, statin  Telemetry  MRI of brain  f/u echo  Frequent neuro checks  Neuro consult, Dr. Ash  Hold anti-HTN meds for permissive hypertension  Fall precautions  SS consult  Physical therapy consult

## 2022-01-26 NOTE — CONSULT NOTE ADULT - TIME BILLING
I counseled the patient about his diagnosis of stroke, and the medications he should continue to take for secondary stroke prevention.

## 2022-01-26 NOTE — ED PROVIDER NOTE - GASTROINTESTINAL NEGATIVE STATEMENT, MLM
MRX given today. no abdominal pain, no bloating, no constipation, no diarrhea, no nausea and no vomiting.

## 2022-01-26 NOTE — CONSULT NOTE ADULT - SUBJECTIVE AND OBJECTIVE BOX
NEUROLOGY CONSULT NOTE    NAME:  ALEXANDRA CLEMENTS      ASSESSMENT:  66 RHM with right facial droop and right-sided hemiparesis, secondary to acute left MCA territory ischemic stroke      RECOMMENDATIONS:    1. Stroke workup  - MRI Brain approved to confirm location and size of stroke  - Transthoracic Echocardiogram  - Telemetry monitoring while inpatient  - Hemoglobin A1c    2. Secondary stroke prevention  - Q4H Neurochecks & Vital signs  - Bedside swallow evaluation before administering any PO meds  - Aspirin 81mg daily (or aspirin 300mg SD daily if NPO)  - Clopidogrel 75mg daily x 21 days  - Can reduce Atorvastatin from 80mg back to home dose of 40mg QHS since LDL < 70)  - Treat BP if over 180/110 for the rest of today, then starting tomorrow, treat BP if over 140/90 (goal /80)  - Diabetes management as per primary team  - PT/OT  - DVT ppx: SCDs, Enoxaparin or Heparin        *******************************      CHIEF COMPLAINT:  Patient is a 66y old  Male who presents with a chief complaint of Stroke (26 Jan 2022 18:21)      HPI:  The patient is a 67 yo M with PMH of IDDM, HTN, HLD, CAD presenting with right sided weakness since 10:30am this morning. The patient felt malaise and general weakness last night around 10:00 pm, went to sleep around midnight and woke-up at 10:30am. Since waking he noticed difficulty ambulating due to right leg weakness. He also noticed right arm weakness. Around 1:30pm his son noticed slurred speech prompting him to come to the ED. Patient has history of angiogram more than 10 years ago but denies stent. Patient denies headache, LOC, chest pain, shortness of breath, LE swelling. In ED: s/p 325 ASA and 40mg atorvastatin. CTA shows 22 mL of active ischemia in anterior left temporal lobe. Tele stroke consulted. Patient not a candidate for acute intervention. Out of window for tpa. (26 Jan 2022 18:21)      NEURO HPI:  66 RHM who states that he is okay now, but that he had right-sided weakness earlier today that made it difficulty for him to walk. He was last seen normal by family at 22:30 last night.      PAST MEDICAL & SURGICAL HISTORY:  HTN (hypertension)  DM (diabetes mellitus)  Hypercholesterolemia  CAD (coronary artery disease)  No significant past surgical history      MEDICATIONS:  aspirin enteric coated 81 milliGRAM(s) Oral daily  atorvastatin 80 milliGRAM(s) Oral at bedtime  clopidogrel Tablet 75 milliGRAM(s) Oral daily  enoxaparin Injectable 40 milliGRAM(s) SubCutaneous at bedtime  insulin glargine Injectable (LANTUS) 15 Unit(s) SubCutaneous at bedtime  insulin lispro (ADMELOG) corrective regimen sliding scale   SubCutaneous three times a day before meals  polyethylene glycol 3350 17 Gram(s) Oral daily  senna 2 Tablet(s) Oral at bedtime  sodium chloride 0.9%. 1000 milliLiter(s) IV Continuous <Continuous>      ALLERGIES:  No Known Allergies      FAMILY HISTORY:  Denies family history of stroke  Hypertension present in multiple family members, including both parents      SOCIAL HISTORY:  Former smoker, quit 20 years ago  Denies tobacco or illicit drug use      REVIEW OF SYSTEMS:  GENERAL: No fever, weight changes, fatigue  EYES: No eye pain or discharge  EAR/NOSE/MOUTH/THROAT: No sinus or throat pain; No difficulty hearing  NECK: No pain or stiffness  RESPIRATORY: No cough, wheezing, chills, or hemoptysis  CARDIOVASCULAR: No chest pain, palpitations, shortness of breath, or dyspnea on exertion  GASTROINTESTINAL: No abdominal pain, nausea, vomiting, hematemesis, diarrhea, or constipation  GENITOURINARY: No dysuria, frequency, hematuria, or incontinence  SKIN: No rashes or lesions  ENDOCRINE: No heat or cold intolerance  HEMATOLOGIC: No easy bruising or bleeding  PSYCHIATRIC: No depression, anxiety, or mood swings  MUSCULOSKELETAL: No joint pain or swelling  NEUROLOGICAL: As per HPI        OBJECTIVE:    Vital Signs Last 24 Hrs  T(C): 36.8 (26 Jan 2022 20:24), Max: 36.8 (26 Jan 2022 15:20)  T(F): 98.2 (26 Jan 2022 20:24), Max: 98.2 (26 Jan 2022 15:20)  HR: 65 (26 Jan 2022 19:00) (61 - 78)  BP: 163/66 (26 Jan 2022 20:24) (163/66 - 206/83)  RR: 18 (26 Jan 2022 20:24) (17 - 20)  SpO2: 100% (26 Jan 2022 20:24) (99% - 100%)    General Examination:  General: No acute distress  HEENT: Atraumatic, Normocephalic  Respiratory: CTA B/l.  No crackles, rhonchi, or wheezes.  Cardiovascular: RRR.  Normal S1 & S2.  Normal b/l radial and pedal pulses.    Neurological Examination:  General / Mental Status: AAO x 3.  No aphasia or dysarthria.  Naming and repetition intact.  Cranial Nerves: VFF x 4.  PERRL.  EOMI x 2, No nystagmus or diplopia.  B/l V1-V3 equal and intact to light touch and pinprick.  Right lower facial droop present.  Symmetric palate elevation present.  B/l hearing equal to finger rub.  5/5 strength with b/l sternocleidomastoid & trapezius.  Midline tongue protrusion, with no atrophy or fasciculations.  Motor: Normal bulk & tone in all four extremities.  At least 4/5 strength throughout right upper extremity, without downward drift, and with full right hand  strength.  At least 3/5 strength throughout right lower extremity, with downward drift.  5/5 strength left upper extremity and left lower extremity, without downward drift in either, and with full left hand  strength.  No rigidity, spasticity, or tremors in any of the four extremities.  Sensory: Intact to light touch and pinprick in all four extremities.  Reflex: 1+ and symmetric at b/l biceps, triceps, brachioradialis, patellae, and ankles.  Downgoing toes b/l.  Coordination: No dysmetria with b/l finger-to-nose and heel raise tests.  Gait and Romberg sign testing deferred due to right-sided hemiparesis.    NIHSS Score:    LOC - 0  LOC Questions - 0  LOC Commands - 0  Gaze Preference - 0  Visual Fields - 0  Facial Palsy - 2  Motor Arm Left - 0  Motor Arm Right - 0  Motor Leg Left - 0  Motor Leg Right - 1  Limb Ataxia - 0  Sensory - 0  Language - 0  Speech - 0  Extinction - 0    NIHSS Score Total: 3    Modified [employer] Scale: 2          LABORATORY VALUES:                        14.3   10.40 )-----------( 219      ( 26 Jan 2022 15:24 )             40.7       01-26    136  |  101  |  21<H>  ----------------------------<  280<H>  4.0   |  30  |  1.31<H>    Ca    8.9      26 Jan 2022 15:24  Mg     2.2     01-26    TPro  7.0  /  Alb  3.0<L>  /  TBili  0.4  /  DBili  x   /  AST  11  /  ALT  14  /  AlkPhos  120  01-26 01-26 Chol 152 LDL 61 HDL 37<L> Trig 268<H>          NEUROIMAGING:      CT Head & CTA Head/Neck & CT Perfusion Head (1/26/22):  - No acute intracranial structural abnormality  - Left PCA stenosis  - No other intracranial or neck vessel abnormality  - Possibly hypoperfusion in anterior left temporal lobe        Please contact the Neurology consult service with any neurological questions.    Cm Ash MD   of Neurology  [facility] at [facility] NEUROLOGY CONSULT NOTE    NAME:  ALEXANDRA CLEMENTS      ASSESSMENT:  66 RHM with right facial droop and right-sided hemiparesis, secondary to acute left MCA territory ischemic stroke      RECOMMENDATIONS:    1. Stroke workup  - MRI Brain approved to confirm location and size of stroke (if there are no contraindications - patient denies having any metal in body)  - Transthoracic Echocardiogram  - Telemetry monitoring while inpatient  - Hemoglobin A1c    2. Secondary stroke prevention  - Q4H Neurochecks & Vital signs  - Bedside swallow evaluation before administering any PO meds  - Aspirin 81mg daily (or aspirin 300mg OK daily if NPO)  - Clopidogrel 75mg daily x 21 days  - Can reduce Atorvastatin from 80mg back to home dose of 40mg QHS since LDL < 70)  - Treat BP if over 180/110 for the rest of today, then starting tomorrow, treat BP if over 140/90 (goal /80)  - Diabetes management as per primary team  - PT/OT  - DVT ppx: SCDs, Enoxaparin or Heparin        *******************************      CHIEF COMPLAINT:  Patient is a 66y old  Male who presents with a chief complaint of Stroke (26 Jan 2022 18:21)      HPI:  The patient is a 67 yo M with PMH of IDDM, HTN, HLD, CAD presenting with right sided weakness since 10:30am this morning. The patient felt malaise and general weakness last night around 10:00 pm, went to sleep around midnight and woke-up at 10:30am. Since waking he noticed difficulty ambulating due to right leg weakness. He also noticed right arm weakness. Around 1:30pm his son noticed slurred speech prompting him to come to the ED. Patient has history of angiogram more than 10 years ago but denies stent. Patient denies headache, LOC, chest pain, shortness of breath, LE swelling. In ED: s/p 325 ASA and 40mg atorvastatin. CTA shows 22 mL of active ischemia in anterior left temporal lobe. Tele stroke consulted. Patient not a candidate for acute intervention. Out of window for tpa. (26 Jan 2022 18:21)      NEURO HPI:  66 RHM who states that he is okay now, but that he had right-sided weakness earlier today that made it difficulty for him to walk. He was last seen normal by family at 22:30 last night.      PAST MEDICAL & SURGICAL HISTORY:  HTN (hypertension)  DM (diabetes mellitus)  Hypercholesterolemia  CAD (coronary artery disease)  No significant past surgical history      MEDICATIONS:  aspirin enteric coated 81 milliGRAM(s) Oral daily  atorvastatin 80 milliGRAM(s) Oral at bedtime  clopidogrel Tablet 75 milliGRAM(s) Oral daily  enoxaparin Injectable 40 milliGRAM(s) SubCutaneous at bedtime  insulin glargine Injectable (LANTUS) 15 Unit(s) SubCutaneous at bedtime  insulin lispro (ADMELOG) corrective regimen sliding scale   SubCutaneous three times a day before meals  polyethylene glycol 3350 17 Gram(s) Oral daily  senna 2 Tablet(s) Oral at bedtime  sodium chloride 0.9%. 1000 milliLiter(s) IV Continuous <Continuous>      ALLERGIES:  No Known Allergies      FAMILY HISTORY:  Denies family history of stroke  Hypertension present in multiple family members, including both parents      SOCIAL HISTORY:  Former smoker, quit 20 years ago  Denies tobacco or illicit drug use      REVIEW OF SYSTEMS:  GENERAL: No fever, weight changes, fatigue  EYES: No eye pain or discharge  EAR/NOSE/MOUTH/THROAT: No sinus or throat pain; No difficulty hearing  NECK: No pain or stiffness  RESPIRATORY: No cough, wheezing, chills, or hemoptysis  CARDIOVASCULAR: No chest pain, palpitations, shortness of breath, or dyspnea on exertion  GASTROINTESTINAL: No abdominal pain, nausea, vomiting, hematemesis, diarrhea, or constipation  GENITOURINARY: No dysuria, frequency, hematuria, or incontinence  SKIN: No rashes or lesions  ENDOCRINE: No heat or cold intolerance  HEMATOLOGIC: No easy bruising or bleeding  PSYCHIATRIC: No depression, anxiety, or mood swings  MUSCULOSKELETAL: No joint pain or swelling  NEUROLOGICAL: As per HPI        OBJECTIVE:    Vital Signs Last 24 Hrs  T(C): 36.8 (26 Jan 2022 20:24), Max: 36.8 (26 Jan 2022 15:20)  T(F): 98.2 (26 Jan 2022 20:24), Max: 98.2 (26 Jan 2022 15:20)  HR: 65 (26 Jan 2022 19:00) (61 - 78)  BP: 163/66 (26 Jan 2022 20:24) (163/66 - 206/83)  RR: 18 (26 Jan 2022 20:24) (17 - 20)  SpO2: 100% (26 Jan 2022 20:24) (99% - 100%)    General Examination:  General: No acute distress  HEENT: Atraumatic, Normocephalic  Respiratory: CTA B/l.  No crackles, rhonchi, or wheezes.  Cardiovascular: RRR.  Normal S1 & S2.  Normal b/l radial and pedal pulses.    Neurological Examination:  General / Mental Status: AAO x 3.  No aphasia or dysarthria.  Naming and repetition intact.  Cranial Nerves: VFF x 4.  PERRL.  EOMI x 2, No nystagmus or diplopia.  B/l V1-V3 equal and intact to light touch and pinprick.  Right lower facial droop present.  Symmetric palate elevation present.  B/l hearing equal to finger rub.  5/5 strength with b/l sternocleidomastoid & trapezius.  Midline tongue protrusion, with no atrophy or fasciculations.  Motor: Normal bulk & tone in all four extremities.  At least 4/5 strength throughout right upper extremity, without downward drift, and with full right hand  strength.  At least 3/5 strength throughout right lower extremity, with downward drift.  5/5 strength left upper extremity and left lower extremity, without downward drift in either, and with full left hand  strength.  No rigidity, spasticity, or tremors in any of the four extremities.  Sensory: Intact to light touch and pinprick in all four extremities.  Reflex: 1+ and symmetric at b/l biceps, triceps, brachioradialis, patellae, and ankles.  Downgoing toes b/l.  Coordination: No dysmetria with b/l finger-to-nose and heel raise tests.  Gait and Romberg sign testing deferred due to right-sided hemiparesis.    NIHSS Score:    LOC - 0  LOC Questions - 0  LOC Commands - 0  Gaze Preference - 0  Visual Fields - 0  Facial Palsy - 2  Motor Arm Left - 0  Motor Arm Right - 0  Motor Leg Left - 0  Motor Leg Right - 1  Limb Ataxia - 0  Sensory - 0  Language - 0  Speech - 0  Extinction - 0    NIHSS Score Total: 3 - No IV tPA because patient presented outside the time window    Modified [place] Scale: 2          LABORATORY VALUES:                        14.3   10.40 )-----------( 219      ( 26 Jan 2022 15:24 )             40.7       01-26    136  |  101  |  21<H>  ----------------------------<  280<H>  4.0   |  30  |  1.31<H>    Ca    8.9      26 Jan 2022 15:24  Mg     2.2     01-26    TPro  7.0  /  Alb  3.0<L>  /  TBili  0.4  /  DBili  x   /  AST  11  /  ALT  14  /  AlkPhos  120  01-26 01-26 Chol 152 LDL 61 HDL 37<L> Trig 268<H>          NEUROIMAGING:      CT Head & CTA Head/Neck & CT Perfusion Head (1/26/22):  - No acute intracranial structural abnormality  - Left PCA stenosis  - No other intracranial or neck vessel abnormality  - Possibly hypoperfusion in anterior left temporal lobe        Please contact the Neurology consult service with any neurological questions.    Cm Ash MD   of Neurology  [facility] at [facility]

## 2022-01-26 NOTE — H&P ADULT - PROBLEM SELECTOR PLAN 3
Patient with HLD on atorvastatin 40mg    atorvastatin 80mg Pt with hx of HTN, on metroprolol tartrate 100, amlodopine 5mg, losartan/hctz 100-25  /58  Hold for permissive HTN

## 2022-01-26 NOTE — H&P ADULT - PROBLEM SELECTOR PLAN 4
Pt with hx of CAD on ASA    c/w ASA  Add plavix 75mg  Increase lipitor to 80mg Patient with HLD on atorvastatin 40mg    atorvastatin 80mg

## 2022-01-26 NOTE — ED PROVIDER NOTE - OBJECTIVE STATEMENT
66 y.o. male with h/o NIDDM, last dose last night, as per son, pt went to sleep ~midnight, ~10pm, pt c/o not feeling well.  Pt woke up @ 10:30am, noted Rt sided weakness.  Son noted pt with slurred speech.  No dizziness, HA, 66 y.o. male with h/o NIDDM, last dose last night, as per son, pt went to sleep ~midnight, ~10pm, pt c/o not feeling well.  Pt woke up @ 10:30am, noted Rt sided weakness.  Son noted pt with slurred speech.  No dizziness, HA,.  Pt c/o Rt sided weakness ~10pm.  Pt took ASA 81mg earlier today.

## 2022-01-26 NOTE — ED PROVIDER NOTE - PROGRESS NOTE DETAILS
Case d/w Telestroke Dr. Christine, will get back after reviewing CT as per Dr. Christine, pt's not a Endovascular candidate, will admit to [facility]

## 2022-01-26 NOTE — H&P ADULT - PROBLEM SELECTOR PLAN 2
Pt with hx of HTN, on metroprolol tartrate 100, amlodopine 5mg, losartan/hctz 100-25  Hold for permissive HTN Pt with hx of HTN, on metroprolol tartrate 100, amlodopine 5mg, losartan/hctz 100-25  /58  Hold for permissive HTN Creat 1.31, no baseline  Denies kidney disease  f/u urine studies

## 2022-01-26 NOTE — H&P ADULT - ASSESSMENT
The patient is a 65 yo M with PMH of IDDM, HTN, HLD, CAD presenting with right sided weakness found to have left temporal lobe ischemia on CTA. No acute intervention or tpa indicated. No signs of hemorrhage. Will start stroke protocol, get MRI, consult neuro

## 2022-01-26 NOTE — PATIENT PROFILE ADULT - FALL HARM RISK - UNIVERSAL INTERVENTIONS
Bed in lowest position, wheels locked, appropriate side rails in place/Call bell, personal items and telephone in reach/Instruct patient to call for assistance before getting out of bed or chair/Non-slip footwear when patient is out of bed/[place] to call system/Physically safe environment - no spills, clutter or unnecessary equipment/Purposeful Proactive Rounding/Room/bathroom lighting operational, light cord in reach

## 2022-01-26 NOTE — ED PROVIDER NOTE - CARE PLAN
1 Principal Discharge DX:	Cerebrovascular accident (CVA)   Principal Discharge DX:	Cerebrovascular accident (CVA)  Secondary Diagnosis:	SHARA (acute kidney injury)

## 2022-01-26 NOTE — ED PROVIDER NOTE - CLINICAL SUMMARY MEDICAL DECISION MAKING FREE TEXT BOX
Rt sided weakness, pt's out of the window for tPA, will get CTA, perfusion scan for poss neuro rescue.

## 2022-01-26 NOTE — H&P ADULT - PROBLEM SELECTOR PLAN 5
No sign of hemorrhage  Lovenox for DVT ppx Pt with hx of CAD on ASA    c/w ASA  Add plavix 75mg  Increase lipitor to 80mg

## 2022-01-26 NOTE — H&P ADULT - NSHPREVIEWOFSYSTEMS_GEN_ALL_CORE
CONSTITUTIONAL: No fever, weight loss, or fatigue  EYES: No eye pain, visual disturbances, or discharge  ENT:  No difficulty hearing, tinnitus, vertigo; No sinus or throat pain  NECK: No pain or stiffness  RESPIRATORY: No cough, wheezing, chills or hemoptysis; No Shortness of Breath  CARDIOVASCULAR: No chest pain, palpitations, passing out, dizziness, or leg swelling  GASTROINTESTINAL: No abdominal or epigastric pain. No nausea, vomiting, or hematemesis; No diarrhea or constipation. No melena or hematochezia.  GENITOURINARY: No dysuria, frequency, hematuria, or incontinence  NEUROLOGICAL: No headaches, memory loss, loss of strength, numbness, or tremors  SKIN: No itching, burning, rashes, or lesions   LYMPH Nodes: No enlarged glands  ENDOCRINE: No heat or cold intolerance; No hair loss  MUSCULOSKELETAL: No joint pain or swelling; No muscle, back, No extremity pain  PSYCHIATRIC: No depression, anxiety, mood swings, or difficulty sleeping  HEME/LYMPH: No easy bruising, or bleeding gums  ALLERGY AND IMMUNOLOGIC: No hives or eczema CONSTITUTIONAL: No fever, weight loss, or fatigue  EYES: No eye pain, visual disturbances, or discharge  ENT:  No difficulty hearing, tinnitus, vertigo; No sinus or throat pain  NECK: No pain or stiffness  RESPIRATORY: No cough, wheezing, chills or hemoptysis; No Shortness of Breath  CARDIOVASCULAR: No chest pain, palpitations, passing out, dizziness, or leg swelling  GASTROINTESTINAL: No abdominal or epigastric pain. No nausea, vomiting, or hematemesis; No diarrhea or constipation. No melena or hematochezia.  GENITOURINARY: No dysuria, frequency, hematuria, or incontinence  NEUROLOGICAL: Right sided weakness  SKIN: No itching, burning, rashes, or lesions   LYMPH Nodes: No enlarged glands  ENDOCRINE: No heat or cold intolerance; No hair loss  MUSCULOSKELETAL: No joint pain or swelling; No muscle, back, No extremity pain  PSYCHIATRIC: No depression, anxiety, mood swings, or difficulty sleeping  HEME/LYMPH: No easy bruising, or bleeding gums  ALLERGY AND IMMUNOLOGIC: No hives or eczema

## 2022-01-27 DIAGNOSIS — E11.9 TYPE 2 DIABETES MELLITUS WITHOUT COMPLICATIONS: ICD-10-CM

## 2022-01-27 DIAGNOSIS — E11.65 TYPE 2 DIABETES MELLITUS WITH HYPERGLYCEMIA: ICD-10-CM

## 2022-01-27 LAB
A1C WITH ESTIMATED AVERAGE GLUCOSE RESULT: 11.4 % — HIGH (ref 4–5.6)
ANION GAP SERPL CALC-SCNC: 8 MMOL/L — SIGNIFICANT CHANGE UP (ref 5–17)
BUN SERPL-MCNC: 17 MG/DL — SIGNIFICANT CHANGE UP (ref 7–18)
CALCIUM SERPL-MCNC: 9.1 MG/DL — SIGNIFICANT CHANGE UP (ref 8.4–10.5)
CHLORIDE SERPL-SCNC: 99 MMOL/L — SIGNIFICANT CHANGE UP (ref 96–108)
CO2 SERPL-SCNC: 29 MMOL/L — SIGNIFICANT CHANGE UP (ref 22–31)
CREAT SERPL-MCNC: 1.25 MG/DL — SIGNIFICANT CHANGE UP (ref 0.5–1.3)
ESTIMATED AVERAGE GLUCOSE: 280 MG/DL — HIGH (ref 68–114)
GLUCOSE BLDC GLUCOMTR-MCNC: 270 MG/DL — HIGH (ref 70–99)
GLUCOSE BLDC GLUCOMTR-MCNC: 274 MG/DL — HIGH (ref 70–99)
GLUCOSE BLDC GLUCOMTR-MCNC: 293 MG/DL — HIGH (ref 70–99)
GLUCOSE BLDC GLUCOMTR-MCNC: 295 MG/DL — HIGH (ref 70–99)
GLUCOSE BLDC GLUCOMTR-MCNC: 315 MG/DL — HIGH (ref 70–99)
GLUCOSE SERPL-MCNC: 290 MG/DL — HIGH (ref 70–99)
HCT VFR BLD CALC: 39.7 % — SIGNIFICANT CHANGE UP (ref 39–50)
HCV AB S/CO SERPL IA: 0.14 S/CO — SIGNIFICANT CHANGE UP (ref 0–0.99)
HCV AB SERPL-IMP: SIGNIFICANT CHANGE UP
HGB BLD-MCNC: 13.5 G/DL — SIGNIFICANT CHANGE UP (ref 13–17)
MCHC RBC-ENTMCNC: 29.2 PG — SIGNIFICANT CHANGE UP (ref 27–34)
MCHC RBC-ENTMCNC: 34 GM/DL — SIGNIFICANT CHANGE UP (ref 32–36)
MCV RBC AUTO: 85.9 FL — SIGNIFICANT CHANGE UP (ref 80–100)
NRBC # BLD: 0 /100 WBCS — SIGNIFICANT CHANGE UP (ref 0–0)
PLATELET # BLD AUTO: 223 K/UL — SIGNIFICANT CHANGE UP (ref 150–400)
POTASSIUM SERPL-MCNC: 3.7 MMOL/L — SIGNIFICANT CHANGE UP (ref 3.5–5.3)
POTASSIUM SERPL-SCNC: 3.7 MMOL/L — SIGNIFICANT CHANGE UP (ref 3.5–5.3)
RBC # BLD: 4.62 M/UL — SIGNIFICANT CHANGE UP (ref 4.2–5.8)
RBC # FLD: 11.3 % — SIGNIFICANT CHANGE UP (ref 10.3–14.5)
SODIUM SERPL-SCNC: 136 MMOL/L — SIGNIFICANT CHANGE UP (ref 135–145)
WBC # BLD: 10.21 K/UL — SIGNIFICANT CHANGE UP (ref 3.8–10.5)
WBC # FLD AUTO: 10.21 K/UL — SIGNIFICANT CHANGE UP (ref 3.8–10.5)

## 2022-01-27 PROCEDURE — 93306 TTE W/DOPPLER COMPLETE: CPT | Mod: 26

## 2022-01-27 PROCEDURE — 70551 MRI BRAIN STEM W/O DYE: CPT | Mod: 26

## 2022-01-27 RX ORDER — SODIUM CHLORIDE 9 MG/ML
1000 INJECTION, SOLUTION INTRAVENOUS
Refills: 0 | Status: DISCONTINUED | OUTPATIENT
Start: 2022-01-27 | End: 2022-01-28

## 2022-01-27 RX ORDER — ATORVASTATIN CALCIUM 80 MG/1
40 TABLET, FILM COATED ORAL AT BEDTIME
Refills: 0 | Status: DISCONTINUED | OUTPATIENT
Start: 2022-01-27 | End: 2022-01-28

## 2022-01-27 RX ORDER — GLUCAGON INJECTION, SOLUTION 0.5 MG/.1ML
1 INJECTION, SOLUTION SUBCUTANEOUS ONCE
Refills: 0 | Status: DISCONTINUED | OUTPATIENT
Start: 2022-01-27 | End: 2022-01-28

## 2022-01-27 RX ORDER — INSULIN LISPRO 100/ML
4 VIAL (ML) SUBCUTANEOUS
Refills: 0 | Status: DISCONTINUED | OUTPATIENT
Start: 2022-01-27 | End: 2022-01-28

## 2022-01-27 RX ORDER — INSULIN GLARGINE 100 [IU]/ML
25 INJECTION, SOLUTION SUBCUTANEOUS AT BEDTIME
Refills: 0 | Status: DISCONTINUED | OUTPATIENT
Start: 2022-01-27 | End: 2022-01-28

## 2022-01-27 RX ADMIN — CLOPIDOGREL BISULFATE 75 MILLIGRAM(S): 75 TABLET, FILM COATED ORAL at 12:19

## 2022-01-27 RX ADMIN — Medication 4 UNIT(S): at 16:53

## 2022-01-27 RX ADMIN — SENNA PLUS 2 TABLET(S): 8.6 TABLET ORAL at 22:00

## 2022-01-27 RX ADMIN — Medication 4 UNIT(S): at 12:13

## 2022-01-27 RX ADMIN — INSULIN GLARGINE 25 UNIT(S): 100 INJECTION, SOLUTION SUBCUTANEOUS at 22:00

## 2022-01-27 RX ADMIN — Medication 3: at 08:13

## 2022-01-27 RX ADMIN — ENOXAPARIN SODIUM 40 MILLIGRAM(S): 100 INJECTION SUBCUTANEOUS at 22:00

## 2022-01-27 RX ADMIN — Medication 3: at 16:52

## 2022-01-27 RX ADMIN — Medication 3: at 12:12

## 2022-01-27 RX ADMIN — ATORVASTATIN CALCIUM 40 MILLIGRAM(S): 80 TABLET, FILM COATED ORAL at 22:00

## 2022-01-27 RX ADMIN — Medication 81 MILLIGRAM(S): at 12:19

## 2022-01-27 NOTE — CONSULT NOTE ADULT - SUBJECTIVE AND OBJECTIVE BOX
Patient is a 66y old  Male who presents with a chief complaint of Stroke (27 Jan 2022 09:53)      HPI:  The patient is a 65 yo M with PMH of IDDM, HTN, HLD, CAD presenting with right sided weakness since 10:30am this morning. The patient felt malaise and general weakness last night around 10:00 pm, went to sleep around midnight and woke-up at 10:30am. Since waking he noticed difficulty ambulating due to right leg weakness. He also noticed right arm weakness. Around 1:30pm his son noticed slurred speech prompting him to come to the ED. Patient has history of angiogram more than 10 years ago but denies stent. Patient denies headache, LOC, chest pain, shortness of breath, LE swelling.    In ED: s/p 325 ASA and 40mg lipitor. CTA shows 22 mL of active ischemia in anterior left temporal lobe. Tele stroke consulted. Patient not a candidate for acute intervention. Out of window for tpa.   (26 Jan 2022 18:21)  Found to have uncont dm. Pt takes basaglar 20-25, glipizide and metformin as out pt with poorly controlled dm- fsg 150- 250 as out pt      PAST MEDICAL & SURGICAL HISTORY:  HTN (hypertension)    DM (diabetes mellitus)    Hypercholesterolemia    CAD (coronary artery disease)    No significant past surgical history           MEDICATIONS  (STANDING):  aspirin enteric coated 81 milliGRAM(s) Oral daily  atorvastatin 80 milliGRAM(s) Oral at bedtime  clopidogrel Tablet 75 milliGRAM(s) Oral daily  dextrose 5%. 1000 milliLiter(s) (100 mL/Hr) IV Continuous <Continuous>  enoxaparin Injectable 40 milliGRAM(s) SubCutaneous at bedtime  glucagon  Injectable 1 milliGRAM(s) IntraMuscular once  insulin glargine Injectable (LANTUS) 25 Unit(s) SubCutaneous at bedtime  insulin lispro (ADMELOG) corrective regimen sliding scale   SubCutaneous three times a day before meals  insulin lispro Injectable (ADMELOG) 4 Unit(s) SubCutaneous three times a day before meals  polyethylene glycol 3350 17 Gram(s) Oral daily  senna 2 Tablet(s) Oral at bedtime  sodium chloride 0.9%. 1000 milliLiter(s) (80 mL/Hr) IV Continuous <Continuous>    MEDICATIONS  (PRN):      FAMILY HISTORY:      SOCIAL HISTORY:      REVIEW OF SYSTEMS:  CONSTITUTIONAL: No fever, weight loss, or fatigue  EYES: No eye pain, visual disturbances, or discharge  ENT:  No difficulty hearing, tinnitus, vertigo; No sinus or throat pain  NECK: No pain or stiffness  RESPIRATORY: No cough, wheezing, chills or hemoptysis; No Shortness of Breath  CARDIOVASCULAR: No chest pain, palpitations, passing out, dizziness, or leg swelling  GASTROINTESTINAL: No abdominal or epigastric pain. No nausea, vomiting, or hematemesis; No diarrhea or constipation. No melena or hematochezia.  GENITOURINARY: No dysuria, frequency, hematuria, or incontinence  NEUROLOGICAL: No headaches, memory loss, loss of strength, numbness, or tremors  SKIN: No itching, burning, rashes, or lesions   LYMPH Nodes: No enlarged glands  ENDOCRINE: No heat or cold intolerance; No hair loss  MUSCULOSKELETAL: No joint pain or swelling; No muscle, back, or extremity pain  PSYCHIATRIC: No depression, anxiety, mood swings, or difficulty sleeping  HEME/LYMPH: No easy bruising, or bleeding gums  ALLERGY AND IMMUNOLOGIC: No hives or eczema	        Vital Signs Last 24 Hrs  T(C): 36.5 (27 Jan 2022 08:11), Max: 37 (27 Jan 2022 04:27)  T(F): 97.7 (27 Jan 2022 08:11), Max: 98.6 (27 Jan 2022 04:27)  HR: 79 (27 Jan 2022 09:09) (57 - 79)  BP: 161/69 (27 Jan 2022 09:09) (141/88 - 206/83)  BP(mean): --  RR: 18 (27 Jan 2022 08:11) (17 - 20)  SpO2: 100% (27 Jan 2022 08:11) (97% - 100%)      Constitutional:      HEENT: nad    Neck:  No JVD, bruits or thyromegaly    Respiratory:  Clear without rales or rhonchi    Cardiovascular:  RR without murmur, rub or gallop.    Gastrointestinal: Soft without hepatosplenomegaly.    Extremities: without cyanosis, clubbing or edema.    Neurological:  Oriented   x   3   . No gross sensory or motor defects.        LABS:                        13.5   10.21 )-----------( 223      ( 27 Jan 2022 07:18 )             39.7     01-27    136  |  99  |  17  ----------------------------<  290<H>  3.7   |  29  |  1.25    Ca    9.1      27 Jan 2022 07:18  Mg     2.2     01-26    TPro  7.0  /  Alb  3.0<L>  /  TBili  0.4  /  DBili  x   /  AST  11  /  ALT  14  /  AlkPhos  120  01-26        PT/INR - ( 26 Jan 2022 15:24 )   PT: 12.0 sec;   INR: 1.01 ratio         PTT - ( 26 Jan 2022 15:24 )  PTT:34.7 sec    CAPILLARY BLOOD GLUCOSE      POCT Blood Glucose.: 274 mg/dL (27 Jan 2022 08:02)  POCT Blood Glucose.: 337 mg/dL (26 Jan 2022 20:56)  POCT Blood Glucose.: 256 mg/dL (26 Jan 2022 15:39)      RADIOLOGY & ADDITIONAL STUDIES:

## 2022-01-27 NOTE — PHYSICAL THERAPY INITIAL EVALUATION ADULT - GENERAL OBSERVATIONS, REHAB EVAL
patient tele referral to right sided weakness and slurred speech in ed, negative head ct, patient resolving katelynn deficits, speech normal, ambulatory level and incline surface, dispensed cane and safe dc planning

## 2022-01-27 NOTE — PROGRESS NOTE ADULT - PROBLEM SELECTOR PLAN 5
Pt with hx of CAD on ASA  c/w ASA  Added plavix 75mg  Increased lipitor to 80mg Patient with HLD on atorvastatin 40mg  atorvastatin 80mg

## 2022-01-27 NOTE — PROGRESS NOTE ADULT - SUBJECTIVE AND OBJECTIVE BOX
PGY-1 Progress Note discussed with attending    PAGER #: [[phone]] TILL 5:00 PM  PLEASE CONTACT ON CALL TEAM:  - On Call Team (Please refer to Kyle) FROM 5:00 PM - 8:30PM  - Nightfloat Team FROM 8:30 -7:30 AM      INTERVAL HPI/OVERNIGHT EVENTS: no acute overnight events. Patient states his weakness has improved.       REVIEW OF SYSTEMS:  CONSTITUTIONAL: No fever, weight loss, or fatigue  RESPIRATORY: No cough, wheezing, chills or hemoptysis; No shortness of breath  CARDIOVASCULAR: No chest pain, palpitations, dizziness, or leg swelling  GASTROINTESTINAL: No abdominal pain. No nausea, vomiting, or hematemesis; No diarrhea or constipation. No melena or hematochezia.  GENITOURINARY: No dysuria or hematuria, urinary frequency  NEUROLOGICAL: No headaches, memory loss, loss of strength, + numbness, no tremors  SKIN: No itching, burning, rashes, or lesions     Vital Signs Last 24 Hrs  T(C): 36.5 (27 Jan 2022 08:11), Max: 37 (27 Jan 2022 04:27)  T(F): 97.7 (27 Jan 2022 08:11), Max: 98.6 (27 Jan 2022 04:27)  HR: 79 (27 Jan 2022 09:09) (57 - 79)  BP: 161/69 (27 Jan 2022 09:09) (141/88 - 206/83)  BP(mean): --  RR: 18 (27 Jan 2022 08:11) (17 - 20)  SpO2: 100% (27 Jan 2022 08:11) (97% - 100%)    PHYSICAL EXAMINATION:  GENERAL: NAD, right facial droop  HEAD:  Atraumatic, Normocephalic  EYES:  conjunctiva and sclera clear  NECK: Supple, No JVD, Normal thyroid  CHEST/LUNG: Clear to auscultation. Clear to percussion bilaterally; No rales, rhonchi, wheezing, or rubs  HEART: Regular rate and rhythm; No murmurs, rubs, or gallops  ABDOMEN: Soft, Nontender, Nondistended; Bowel sounds present  NERVOUS SYSTEM:  Alert & Oriented X3,  upper and lower extremities bilaterally 5/5, sensation   EXTREMITIES:  2+ Peripheral Pulses, No clubbing, cyanosis, or edema  SKIN: warm dry                          13.5   10.21 )-----------( 223      ( 27 Jan 2022 07:18 )             39.7     01-27    136  |  99  |  17  ----------------------------<  290<H>  3.7   |  29  |  1.25    Ca    9.1      27 Jan 2022 07:18  Mg     2.2     01-26    TPro  7.0  /  Alb  3.0<L>  /  TBili  0.4  /  DBili  x   /  AST  11  /  ALT  14  /  AlkPhos  120  01-26    LIVER FUNCTIONS - ( 26 Jan 2022 15:24 )  Alb: 3.0 g/dL / Pro: 7.0 g/dL / ALK PHOS: 120 U/L / ALT: 14 U/L DA / AST: 11 U/L / GGT: x               PT/INR - ( 26 Jan 2022 15:24 )   PT: 12.0 sec;   INR: 1.01 ratio         PTT - ( 26 Jan 2022 15:24 )  PTT:34.7 sec    CAPILLARY BLOOD GLUCOSE      RADIOLOGY & ADDITIONAL TESTS:                   PGY-1 Progress Note discussed with attending    PAGER #: [[phone]] TILL 5:00 PM  PLEASE CONTACT ON CALL TEAM:  - On Call Team (Please refer to Kyle) FROM 5:00 PM - 8:30PM  - Nightfloat Team FROM 8:30 -7:30 AM      INTERVAL HPI/OVERNIGHT EVENTS: no acute overnight events. Patient states his weakness has improved.       REVIEW OF SYSTEMS:  CONSTITUTIONAL: No fever, weight loss, or fatigue  RESPIRATORY: No cough, wheezing, chills or hemoptysis; No shortness of breath  CARDIOVASCULAR: No chest pain, palpitations, dizziness, or leg swelling  GASTROINTESTINAL: No abdominal pain. No nausea, vomiting, or hematemesis; No diarrhea or constipation. No melena or hematochezia.  GENITOURINARY: No dysuria or hematuria, urinary frequency  NEUROLOGICAL: No headaches, memory loss, loss of strength, + numbness, no tremors  SKIN: No itching, burning, rashes, or lesions     Vital Signs Last 24 Hrs  T(C): 36.5 (27 Jan 2022 08:11), Max: 37 (27 Jan 2022 04:27)  T(F): 97.7 (27 Jan 2022 08:11), Max: 98.6 (27 Jan 2022 04:27)  HR: 79 (27 Jan 2022 09:09) (57 - 79)  BP: 161/69 (27 Jan 2022 09:09) (141/88 - 206/83)  BP(mean): --  RR: 18 (27 Jan 2022 08:11) (17 - 20)  SpO2: 100% (27 Jan 2022 08:11) (97% - 100%)    PHYSICAL EXAMINATION:  GENERAL: NAD, right facial droop  HEAD:  Atraumatic, Normocephalic  EYES:  conjunctiva and sclera clear  NECK: Supple, No JVD, Normal thyroid  CHEST/LUNG: Clear to auscultation. Clear to percussion bilaterally; No rales, rhonchi, wheezing, or rubs  HEART: Regular rate and rhythm; No murmurs, rubs, or gallops  ABDOMEN: Soft, Nontender, Nondistended; Bowel sounds present  NERVOUS SYSTEM:  Alert & Oriented X3,  upper and lower extremities bilaterally 5/5, sensation intact  EXTREMITIES:  2+ Peripheral Pulses, No clubbing, cyanosis, or edema  SKIN: warm dry                          13.5   10.21 )-----------( 223      ( 27 Jan 2022 07:18 )             39.7     01-27    136  |  99  |  17  ----------------------------<  290<H>  3.7   |  29  |  1.25    Ca    9.1      27 Jan 2022 07:18  Mg     2.2     01-26    TPro  7.0  /  Alb  3.0<L>  /  TBili  0.4  /  DBili  x   /  AST  11  /  ALT  14  /  AlkPhos  120  01-26    LIVER FUNCTIONS - ( 26 Jan 2022 15:24 )  Alb: 3.0 g/dL / Pro: 7.0 g/dL / ALK PHOS: 120 U/L / ALT: 14 U/L DA / AST: 11 U/L / GGT: x               PT/INR - ( 26 Jan 2022 15:24 )   PT: 12.0 sec;   INR: 1.01 ratio         PTT - ( 26 Jan 2022 15:24 )  PTT:34.7 sec    CAPILLARY BLOOD GLUCOSE      RADIOLOGY & ADDITIONAL TESTS:

## 2022-01-27 NOTE — CONSULT NOTE ADULT - ASSESSMENT
The patient is a 65 yo M with PMH of IDDM, HTN, HLD, CAD presenting with right sided weakness   Found to have uncont dm. Pt takes basaglar 20-25, glipizide and metformin as out pt with poorly controlled dm- fsg 150- 250 as out pt  
67 yo M with PMH of IDDM, HTN, HLD, CAD presenting with right sided weakness since 10:30am this morning.  1.Tele monitoring.  2.Echocardiogram with bubble study.  3.DM-Insulin.Endo eval called.  4.HTN-hold bp medication.  5.Neurology eval noted,await MRI.  6.Lipid d/o-statin.  7.CAD-asa,plavix,statin.  8.GI and DVT prophylaxis.

## 2022-01-27 NOTE — PROGRESS NOTE ADULT - PROBLEM SELECTOR PLAN 3
Pt with hx of HTN, on metroprolol tartrate 100, amlodopine 5mg, losartan/hctz 100-25  /58  Hold for permissive HTN pt takes metformin, lantus 20, and glipizide at home   Dr. Schafer was consulted: will start Insulin 25 units and 4 admelog premeal  f/u A1c

## 2022-01-27 NOTE — CONSULT NOTE ADULT - SUBJECTIVE AND OBJECTIVE BOX
Patient is a 66y old  Male who presents with a chief complaint of Stroke (26 Jan 2022 22:45)  Patient with PMH of IDDM, HTN, HLD, CAD presenting with right sided weakness since 10:30am this morning. The patient felt malaise and general weakness last night around 10:00 pm, went to sleep around midnight and woke-up at 10:30am. Since waking he noticed difficulty ambulating due to right leg weakness. He also noticed right arm weakness. Around 1:30pm his son noticed slurred speech prompting him to come to the ED. Patient has history of angiogram more than 10 years ago but denies stent. Patient denies headache, LOC, chest pain, shortness of breath, LE swelling.    In ED: s/p 325 ASA and 40mg lipitor. CTA shows 22 mL of active ischemia in anterior left temporal lobe. Tele stroke consulted. Patient not a candidate for acute intervention. Out of window for tpa.      INTERVAL HPI/OVERNIGHT EVENTS:  T(C): 37 (01-27-22 @ 04:27), Max: 37 (01-27-22 @ 04:27)  HR: 57 (01-27-22 @ 04:27) (57 - 78)  BP: 162/61 (01-27-22 @ 04:27) (162/61 - 206/83)  RR: 18 (01-27-22 @ 04:27) (17 - 20)  SpO2: 97% (01-27-22 @ 04:27) (97% - 100%)  Wt(kg): --  I&O's Summary      PAST MEDICAL & SURGICAL HISTORY:  HTN (hypertension)    DM (diabetes mellitus)    Hypercholesterolemia    CAD (coronary artery disease)    No significant past surgical history        SOCIAL HISTORY  Alcohol:  Tobacco:  Illicit substance use:      FAMILY HISTORY:      LABS:                        14.3   10.40 )-----------( 219      ( 26 Jan 2022 15:24 )             40.7     01-26    136  |  101  |  21<H>  ----------------------------<  280<H>  4.0   |  30  |  1.31<H>    Ca    8.9      26 Jan 2022 15:24  Mg     2.2     01-26    TPro  7.0  /  Alb  3.0<L>  /  TBili  0.4  /  DBili  x   /  AST  11  /  ALT  14  /  AlkPhos  120  01-26    PT/INR - ( 26 Jan 2022 15:24 )   PT: 12.0 sec;   INR: 1.01 ratio         PTT - ( 26 Jan 2022 15:24 )  PTT:34.7 sec    CAPILLARY BLOOD GLUCOSE      POCT Blood Glucose.: 337 mg/dL (26 Jan 2022 20:56)  POCT Blood Glucose.: 256 mg/dL (26 Jan 2022 15:39)            MEDICATIONS  (STANDING):  aspirin enteric coated 81 milliGRAM(s) Oral daily  atorvastatin 80 milliGRAM(s) Oral at bedtime  clopidogrel Tablet 75 milliGRAM(s) Oral daily  enoxaparin Injectable 40 milliGRAM(s) SubCutaneous at bedtime  insulin glargine Injectable (LANTUS) 15 Unit(s) SubCutaneous at bedtime  insulin lispro (ADMELOG) corrective regimen sliding scale   SubCutaneous three times a day before meals  polyethylene glycol 3350 17 Gram(s) Oral daily  senna 2 Tablet(s) Oral at bedtime  sodium chloride 0.9%. 1000 milliLiter(s) (80 mL/Hr) IV Continuous <Continuous>    MEDICATIONS  (PRN):      REVIEW OF SYSTEMS:  CONSTITUTIONAL: No fever, weight loss, or fatigue  EYES: No eye pain, visual disturbances, or discharge  ENMT:  No difficulty hearing, tinnitus, vertigo; No sinus or throat pain  NECK: No pain or stiffness  RESPIRATORY: No cough, wheezing, chills or hemoptysis; No shortness of breath  CARDIOVASCULAR: No chest pain, palpitations, dizziness, or leg swelling  GASTROINTESTINAL: No abdominal or epigastric pain. No nausea, vomiting, or hematemesis; No diarrhea or constipation. No melena or hematochezia.  GENITOURINARY: No dysuria, frequency, hematuria, or incontinence  NEUROLOGICAL: No headaches, memory loss, loss of strength, numbness, or tremors  SKIN: No itching, burning, rashes, or lesions   LYMPH NODES: No enlarged glands  ENDOCRINE: No heat or cold intolerance; No hair loss  MUSCULOSKELETAL: No joint pain or swelling; No muscle, back, or extremity pain  PSYCHIATRIC: No depression, anxiety, mood swings, or difficulty sleeping  HEME/LYMPH: No easy bruising, or bleeding gums  ALLERY AND IMMUNOLOGIC: No hives or eczema    PHYSICAL EXAM:  GENERAL: NAD, well-groomed, well-developed  HEAD:  Atraumatic, Normocephalic  EYES: EOMI, PERRLA, conjunctiva and sclera clear  ENMT: No tonsillar erythema, exudates, or enlargement; Moist mucous membranes, Good dentition, No lesions  NECK: Supple, No JVD, Normal thyroid  NERVOUS SYSTEM:  Alert & Oriented X3, Good concentration; Motor Strength 5/5 B/L upper and lower extremities; DTRs 2+ intact and symmetric  CHEST/LUNG: Clear to percussion bilaterally; No rales, rhonchi, wheezing, or rubs  HEART: Regular rate and rhythm; No murmurs, rubs, or gallops  ABDOMEN: Soft, Nontender, Nondistended; Bowel sounds present  EXTREMITIES:  2+ Peripheral Pulses, No clubbing, cyanosis, or edema  LYMPH: No lymphadenopathy noted  SKIN: No rashes or lesions    RADIOLOGY & ADDITIONAL TESTS:    Imaging Personally Reviewed:  [ ] YES  [ ] NO    Consultant(s) Notes Reviewed:  [ ] YES  [ ] NO        Care Discussed with Consultants/Other Providers [ ] YES  [ ] NO Patient is a 66y old  Male who presents with a chief complaint of Stroke (26 Jan 2022 22:45)  Patient with PMH of IDDM, HTN, HLD, CAD presenting with right sided weakness since 10:30am this morning. The patient felt malaise and general weakness last night around 10:00 pm, went to sleep around midnight and woke-up at 10:30am. Since waking he noticed difficulty ambulating due to right leg weakness. He also noticed right arm weakness. Around 1:30pm his son noticed slurred speech prompting him to come to the ED. Patient has history of angiogram more than 10 years ago but denies stent. Patient denies headache, LOC, chest pain, shortness of breath, LE swelling.    In ED: s/p 325 ASA and 40mg lipitor. CTA shows 22 mL of active ischemia in anterior left temporal lobe. Tele stroke consulted. Patient not a candidate for acute intervention. Out of window for tpa. Awake, alert, comfortable in bed in NAD      INTERVAL HPI/OVERNIGHT EVENTS:  T(C): 37 (01-27-22 @ 04:27), Max: 37 (01-27-22 @ 04:27)  HR: 57 (01-27-22 @ 04:27) (57 - 78)  BP: 162/61 (01-27-22 @ 04:27) (162/61 - 206/83)  RR: 18 (01-27-22 @ 04:27) (17 - 20)  SpO2: 97% (01-27-22 @ 04:27) (97% - 100%)  Wt(kg): --  I&O's Summary      PAST MEDICAL & SURGICAL HISTORY:  HTN (hypertension)    DM (diabetes mellitus)    Hypercholesterolemia    CAD (coronary artery disease)    No significant past surgical history        SOCIAL HISTORY  Alcohol:  Tobacco:  Illicit substance use:      FAMILY HISTORY:      LABS:                        14.3   10.40 )-----------( 219      ( 26 Jan 2022 15:24 )             40.7     01-26    136  |  101  |  21<H>  ----------------------------<  280<H>  4.0   |  30  |  1.31<H>    Ca    8.9      26 Jan 2022 15:24  Mg     2.2     01-26    TPro  7.0  /  Alb  3.0<L>  /  TBili  0.4  /  DBili  x   /  AST  11  /  ALT  14  /  AlkPhos  120  01-26    PT/INR - ( 26 Jan 2022 15:24 )   PT: 12.0 sec;   INR: 1.01 ratio         PTT - ( 26 Jan 2022 15:24 )  PTT:34.7 sec    CAPILLARY BLOOD GLUCOSE      POCT Blood Glucose.: 337 mg/dL (26 Jan 2022 20:56)  POCT Blood Glucose.: 256 mg/dL (26 Jan 2022 15:39)            MEDICATIONS  (STANDING):  aspirin enteric coated 81 milliGRAM(s) Oral daily  atorvastatin 80 milliGRAM(s) Oral at bedtime  clopidogrel Tablet 75 milliGRAM(s) Oral daily  enoxaparin Injectable 40 milliGRAM(s) SubCutaneous at bedtime  insulin glargine Injectable (LANTUS) 15 Unit(s) SubCutaneous at bedtime  insulin lispro (ADMELOG) corrective regimen sliding scale   SubCutaneous three times a day before meals  polyethylene glycol 3350 17 Gram(s) Oral daily  senna 2 Tablet(s) Oral at bedtime  sodium chloride 0.9%. 1000 milliLiter(s) (80 mL/Hr) IV Continuous <Continuous>    MEDICATIONS  (PRN):      REVIEW OF SYSTEMS:  CONSTITUTIONAL: No fever, weight loss, or fatigue  EYES: No eye pain, visual disturbances, or discharge  ENMT:  No difficulty hearing, tinnitus, vertigo; No sinus or throat pain  NECK: No pain or stiffness  RESPIRATORY: No cough, wheezing, chills or hemoptysis; No shortness of breath  CARDIOVASCULAR: No chest pain, palpitations, dizziness, or leg swelling  GASTROINTESTINAL: No abdominal or epigastric pain. No nausea, vomiting, or hematemesis; No diarrhea or constipation. No melena or hematochezia.  GENITOURINARY: No dysuria, frequency, hematuria, or incontinence  NEUROLOGICAL: No headaches, memory loss, loss of strength, numbness, or tremors  SKIN: No itching, burning, rashes, or lesions   LYMPH NODES: No enlarged glands  ENDOCRINE: No heat or cold intolerance; No hair loss  MUSCULOSKELETAL: No joint pain or swelling; No muscle, back, or extremity pain  PSYCHIATRIC: No depression, anxiety, mood swings, or difficulty sleeping  HEME/LYMPH: No easy bruising, or bleeding gums  ALLERY AND IMMUNOLOGIC: No hives or eczema    PHYSICAL EXAM:  GENERAL: NAD, well-groomed, well-developed  HEAD:  Atraumatic, Normocephalic  EYES: EOMI, PERRLA, conjunctiva and sclera clear  ENMT: No tonsillar erythema, exudates, or enlargement; Moist mucous membranes, Good dentition, No lesions  NECK: Supple, No JVD, Normal thyroid  NERVOUS SYSTEM:  Alert & Oriented X3, Good concentration; Motor Strength 5/5 B/L upper and lower extremities; DTRs 2+ intact and symmetric  CHEST/LUNG: Clear to percussion bilaterally; No rales, rhonchi, wheezing, or rubs  HEART: Regular rate and rhythm; No murmurs, rubs, or gallops  ABDOMEN: Soft, Nontender, Nondistended; Bowel sounds present  EXTREMITIES:  2+ Peripheral Pulses, No clubbing, cyanosis, or edema  LYMPH: No lymphadenopathy noted  SKIN: No rashes or lesions    RADIOLOGY & ADDITIONAL TESTS:  < from: CT Angio Neck w/ IV Cont (01.26.22 @ 15:25) >  IMPRESSION:    CT PERFUSION demonstrated: No core infarct. 22 mL of active ischemia   suggested mostly in the anterior left temporal lobe, but there is motion   artifact.  If symptoms persist consider follow up head CT or MRI, MRA  if no   contraindication.    CTA COW:  Questionable focal stenosis in the P2 segment of the right PCA   on 3-D vessel analysis, however this is not as impressive on the source   images.    CTA NECK: Patent, ECAs, ICAs, no  hemodynamically significant stenosis at    ICA origins by NASCET criteria.  Bilateral vertebral arteries are patent without flow limiting stenosis.      < end of copied text >    Imaging Personally Reviewed:  x] YES  [ ] NO    Consultant(s) Notes Reviewed:  [ x] YES  [ ] NO        Care Discussed with Consultants/Other Providers [ x] YES  [ ] NO

## 2022-01-27 NOTE — CONSULT NOTE ADULT - PROBLEM SELECTOR RECOMMENDATION 9
rec changing basaglar to 25 units  add admelog 4 ac tid  fsg ac and hs  check a1c level  d/w pt possible need for basal/bolus tx  nutrition eval
Neurology eval  PT  Cardio eval  Echo  Carotid doppler  DVT PPX  ASA, Statin, plavix

## 2022-01-27 NOTE — PROGRESS NOTE ADULT - PROBLEM SELECTOR PLAN 6
No sign of hemorrhage  Lovenox for DVT ppx Pt with hx of CAD on ASA  c/w ASA  Added plavix 75mg  Increased lipitor to 80mg

## 2022-01-27 NOTE — PROGRESS NOTE ADULT - PROBLEM SELECTOR PLAN 1
Patient presented with right sided weakness since 10:30am after waking. Went to be at midnight.  CT: microvascular disease, no acute hemorrhage or midline shift. Possible hypodensities in the [place]erald.  CTA shows left temporal lobe ischemia, no core infarct. Neg for hemorrhage.  Tele stroke: not a candidate for acute intervention, out of window for tpa  NIHSS: 4 on admission   Passed bed-side dysphagia screening  Trop -ve. EKG NSR  ASA, plavix, statin  Neuro Dr. Ash was consulted: MRI of brain  Hold anti-HTN meds for permissive hypertension  Fall precautions  Physical therapy consult: home Patient presented with right sided weakness since 10:30am after waking. Went to be at midnight.  CT: microvascular disease, no acute hemorrhage or midline shift. Possible hypodensities in the [place]erald.  CTA shows left temporal lobe ischemia, no core infarct. Neg for hemorrhage.  Tele stroke: not a candidate for acute intervention, out of window for tpa  NIHSS: 4 on admission   Passed bed-side dysphagia screening  Trop -ve. EKG NSR  ASA, plavix, statin  Neuro Dr. Ash was consulted: MRI of brain  Hold anti-HTN meds for permissive hypertension  Fall precautions  Physical therapy consult: home  Dr. Mercado was consulted: f/u echo with bubble

## 2022-01-27 NOTE — CONSULT NOTE ADULT - SUBJECTIVE AND OBJECTIVE BOX
CHIEF COMPLAINT:Patient is a 66y old  Male who presents with a chief complaint of Stroke.      HPI:  The patient is a 65 yo M with PMH of IDDM, HTN, HLD, CAD presenting with right sided weakness since 10:30am this morning. The patient felt malaise and general weakness last night around 10:00 pm, went to sleep around midnight and woke-up at 10:30am. Since waking he noticed difficulty ambulating due to right leg weakness. He also noticed right arm weakness. Around 1:30pm his son noticed slurred speech prompting him to come to the ED. Patient has history of angiogram more than 10 years ago but denies stent. Patient denies headache, LOC, chest pain, shortness of breath, LE swelling.    In ED: s/p 325 ASA and 40mg lipitor. CTA shows 22 mL of active ischemia in anterior left temporal lobe. Tele stroke consulted. Patient not a candidate for acute intervention. Out of window for tpa.   (26 Jan 2022 18:21)      PAST MEDICAL & SURGICAL HISTORY:  HTN (hypertension)    DM (diabetes mellitus)    Hypercholesterolemia    CAD (coronary artery disease)            MEDICATIONS  (STANDING):  aspirin enteric coated 81 milliGRAM(s) Oral daily  atorvastatin 80 milliGRAM(s) Oral at bedtime  clopidogrel Tablet 75 milliGRAM(s) Oral daily  enoxaparin Injectable 40 milliGRAM(s) SubCutaneous at bedtime  insulin glargine Injectable (LANTUS) 15 Unit(s) SubCutaneous at bedtime  insulin lispro (ADMELOG) corrective regimen sliding scale   SubCutaneous three times a day before meals  polyethylene glycol 3350 17 Gram(s) Oral daily  senna 2 Tablet(s) Oral at bedtime  sodium chloride 0.9%. 1000 milliLiter(s) (80 mL/Hr) IV Continuous <Continuous>    MEDICATIONS  (PRN):      FAMILY HISTORY:No hx of CAD      SOCIAL HISTORY:    [x ] Non-smoker    [x ] Alcohol-denies    Allergies    No Known Allergies    Intolerances    	    REVIEW OF SYSTEMS:  CONSTITUTIONAL: No fever, weight loss, or fatigue  EYES: No eye pain, visual disturbances, or discharge  ENT:  No difficulty hearing, tinnitus, vertigo; No sinus or throat pain  NECK: No pain or stiffness  RESPIRATORY: No cough, wheezing, chills or hemoptysis; No Shortness of Breath  CARDIOVASCULAR: No chest pain, palpitations, passing out, dizziness, or leg swelling  GASTROINTESTINAL: No abdominal or epigastric pain. No nausea, vomiting, or hematemesis; No diarrhea or constipation. No melena or hematochezia.  GENITOURINARY: No dysuria, frequency, hematuria, or incontinence  NEUROLOGICAL: No headaches, memory loss,+ loss of strength, No numbness, or tremors  SKIN: No itching, burning, rashes, or lesions   LYMPH Nodes: No enlarged glands  ENDOCRINE: No heat or cold intolerance; No hair loss  MUSCULOSKELETAL: No joint pain or swelling; No muscle, back, or extremity pain  PSYCHIATRIC: No depression, anxiety, mood swings, or difficulty sleeping  HEME/LYMPH: No easy bruising, or bleeding gums  ALLERGY AND IMMUNOLOGIC: No hives or eczema	        PHYSICAL EXAM:  T(C): 36.5 (01-27-22 @ 08:11), Max: 37 (01-27-22 @ 04:27)  HR: 72 (01-27-22 @ 08:11) (57 - 78)  BP: 141/88 (01-27-22 @ 08:11) (141/88 - 206/83)  RR: 18 (01-27-22 @ 08:11) (17 - 20)  SpO2: 100% (01-27-22 @ 08:11) (97% - 100%)        Appearance: Normal	  HEENT:   Normal oral mucosa, PERRL, EOMI	  Lymphatic: No lymphadenopathy  Cardiovascular: Normal S1 S2, No JVD, No murmurs, No edema  Respiratory: Lungs clear to auscultation	  Psychiatry: A & O x 3, Mood & affect appropriate  Gastrointestinal:  Soft, Non-tender, + BS	  Skin: No rashes, No ecchymoses, No cyanosis	  Extremities: Normal range of motion, No clubbing, cyanosis or edema  Vascular: Peripheral pulses palpable 2+ bilaterally        ECG:  	nsr,nl axis  	  	  LABS:	 	          Troponin I, High Sensitivity Result: 6.4 ng/L (01-26-22 @ 15:24)                          13.5   10.21 )-----------( 223      ( 27 Jan 2022 07:18 )             39.7     01-27    136  |  99  |  17  ----------------------------<  290<H>  3.7   |  29  |  1.25    Ca    9.1      27 Jan 2022 07:18  Mg     2.2     01-26    TPro  7.0  /  Alb  3.0<L>  /  TBili  0.4  /  DBili  x   /  AST  11  /  ALT  14  /  AlkPhos  120  01-26    proBNP:   Lipid Profile: Cholesterol 152  LDL --  HDL 37    ldl calc 61  Ratio --      r< from: Xray Chest 1 View AP/PA (01.26.22 @ 15:52) >  ACC: [id number] EXAM:  XR CHEST AP OR PA 1V                          PROCEDURE DATE:  01/26/2022          INTERPRETATION:  AP semierect chest on January 26, 2022 at 3:49 PM. This   is a stroke code.    COMPARISON: None available.    Shallow inspiration crowds the chest.    Heart magnified by technique.    Lungs clear.    IMPRESSION: No acute finding.    --- End of Report ---            YESSENIA CEDENO MD; Attending Radiologist  This document has been electronically signed. Jan 26 2022  3:53PM      ACC: [id number] EXAM:  CT ANGIO BRAIN (W) IC                        ACC: [id number] EXAM:  CT ANGIO NECK (W) IC                        ACC: [id number] EXAM:  CT PERFUSION W MAPS IC                          PROCEDURE DATE:  01/26/2022          INTERPRETATION:  CT PERFUSION WITH MAPS WITH IV CONTRAST, CT ANGIO NECK   WITHOUT AND OR WITH IV CONTRAST, CT ANGIO BRAIN WITHOUT AND OR WITH IV   CONTRAST  CT PERFUSION  CTA OF THE [place] AND NECK:    INDICATIONS: Stroke Code. h/o NIDDM, last doselast night, as per son, pt   went to sleep  midnight,  10pm, pt c/o not feeling well. Went to sleep at midnight. Pt   woke up @ 10:30am, noted Rt sided weakness. Did not call his son until   about 15 minutes ago. Son noted pt with slurred speech.  HCT  No additional clinical history was provided.    TECHNIQUE:  RAPID artificial intelligence was used for perfusion analysis and for   intracranial large vessel occlusion.    Contrast: 130 cc Omnipaque 300 administered. 20 cc discarded.    CTA [place]:  After the intravenous power injection of non-ionic contrast material,   serial thin sections were obtained through the intracranial circulation   on a multislice CT scanner.  Images were reformatted using a dedicated 3D   software package and viewed on a dedicated workstation in multiple planes.    CTA NECK:  After the intravenous power injection of non-ionic contrast material,   serial thin sections were obtained through the cervical circulation on a   multislice CT scanner.  Images were reformatted using a dedicated 3D   software package and viewed on a dedicated workstation in multiple planes.    COMPARISON EXAMINATION: None.    FINDINGS:    CT RAPID PERFUSION: There is some motion artifact.  CBF<30% volume: 0 ml  Tmax>6.0 s volume: 22 mL  Mismatch volume: 22 mL  Mismatch ratio: Infinite    CORE INFARCT: None.  TISSUE AT RISK: 1 a 2 mL mostly of the anterior left temporal lobe and   also mildly and anterior and posterior left sided watershed regions.  MISMATCH RATIO: None.      CTA [place]:  ANTERIOR CIRCULATION  ICA  CAVERNOUS, SUPRACLINOID, BIFURCATION SEGMENTS: Patent without flow   limiting stenosis.    ANTERIOR CEREBRAL ARTERIES: Right A1, anterior communicating and A2   anterior cerebral arteries are unremarkable in course and caliber without   flow limiting stenosis. Absent or hypoplastic left A1 segment of anterior   cerebral artery.    MIDDLE CEREBRAL ARTERIES: Patent bilateral M1, M2, and distal MCA   branches without flow limiting stenosis.      POSTERIOR CIRCULATION:  VERTEBRAL ARTERIES: Patent without flow limiting stenosis.  BASILAR ARTERY: Patent no flow limiting stenosis.  POSTERIOR CEREBRAL ARTERIES: P2 segment stenosis left PCA.      CTA NECK:  GREAT VESSELS: Visualized segments are patent, no flow limiting stenosis.    COMMON CAROTID ARTERIES:  RIGHT CCA: Patent without flow limiting stenosis  LEFT CCA: Patent without flow limiting stenosis    CAROTID BULBS:  RIGHT CB: Patent without flow limiting stenosis  LEFT CB: Patent without flow limiting stenosis    INTERNAL CAROTID ARTERIES: Mild bilateral calcified plaque.  RIGHT ICA: Patent no evidence for any hemodynamically significant   stenosis at the ICA origin by NASCET criteria.  LEFT ICA: Patent no evidence for any hemodynamically significant stenosis   at the ICA origin by NASCET criteria.    VERTEBRAL ARTERIES:  RIGHT VA: Patent no evidence for any flow limiting stenosis.  LEFT VA: Patent no evidence for any flow limiting stenosis.      SOFT TISSUES: Unremarkable  BONES:Unremarkable      IMPRESSION:    CT PERFUSION demonstrated: No core infarct. 22 mL of active ischemia   suggested mostly in the anterior left temporal lobe, but there is motion   artifact.  If symptoms persist consider follow up head CT or MRI, MRA  if no   contraindication.    CTA COW:  Questionable focal stenosis in the P2 segment of the right PCA   on 3-D vessel analysis, however this is not as impressive on the source   images.    CTA NECK: Patent, ECAs, ICAs, no  hemodynamically significant stenosis at    ICA origins by NASCET criteria.  Bilateral vertebral arteries are patent without flow limiting stenosis.    Discussed with Dr. Kern in the ED at 3:35 PM. MRI may provide helpful   additional evaluation, if clinically indicated.        SEVERINO PALMA MD; Attending Radiologist  This document has been electronically signed. Jan 26 2022  3:45PM

## 2022-01-28 ENCOUNTER — TRANSCRIPTION ENCOUNTER (OUTPATIENT)
Age: 67
End: 2022-01-28

## 2022-01-28 VITALS
DIASTOLIC BLOOD PRESSURE: 82 MMHG | OXYGEN SATURATION: 99 % | HEART RATE: 82 BPM | SYSTOLIC BLOOD PRESSURE: 172 MMHG | TEMPERATURE: 98 F | RESPIRATION RATE: 18 BRPM

## 2022-01-28 LAB
APPEARANCE UR: CLEAR — SIGNIFICANT CHANGE UP
BACTERIA # UR AUTO: ABNORMAL /HPF
BILIRUB UR-MCNC: NEGATIVE — SIGNIFICANT CHANGE UP
COLOR SPEC: YELLOW — SIGNIFICANT CHANGE UP
CREAT ?TM UR-MCNC: 95 MG/DL — SIGNIFICANT CHANGE UP
DIFF PNL FLD: ABNORMAL
EPI CELLS # UR: SIGNIFICANT CHANGE UP /HPF
GLUCOSE BLDC GLUCOMTR-MCNC: 239 MG/DL — HIGH (ref 70–99)
GLUCOSE BLDC GLUCOMTR-MCNC: 337 MG/DL — HIGH (ref 70–99)
GLUCOSE BLDC GLUCOMTR-MCNC: 367 MG/DL — HIGH (ref 70–99)
GLUCOSE UR QL: 1000 MG/DL
HYALINE CASTS # UR AUTO: ABNORMAL /LPF
KETONES UR-MCNC: NEGATIVE — SIGNIFICANT CHANGE UP
LEUKOCYTE ESTERASE UR-ACNC: NEGATIVE — SIGNIFICANT CHANGE UP
NITRITE UR-MCNC: NEGATIVE — SIGNIFICANT CHANGE UP
PH UR: 6 — SIGNIFICANT CHANGE UP (ref 5–8)
PROT UR-MCNC: 500 MG/DL
RBC CASTS # UR COMP ASSIST: ABNORMAL /HPF (ref 0–2)
SODIUM UR-SCNC: 48 MMOL/L — SIGNIFICANT CHANGE UP
SP GR SPEC: 1.01 — SIGNIFICANT CHANGE UP (ref 1.01–1.02)
UROBILINOGEN FLD QL: NEGATIVE — SIGNIFICANT CHANGE UP
WBC UR QL: SIGNIFICANT CHANGE UP /HPF (ref 0–5)

## 2022-01-28 PROCEDURE — 99233 SBSQ HOSP IP/OBS HIGH 50: CPT

## 2022-01-28 PROCEDURE — 82962 GLUCOSE BLOOD TEST: CPT

## 2022-01-28 PROCEDURE — 70551 MRI BRAIN STEM W/O DYE: CPT

## 2022-01-28 PROCEDURE — 70496 CT ANGIOGRAPHY HEAD: CPT | Mod: MA

## 2022-01-28 PROCEDURE — 71045 X-RAY EXAM CHEST 1 VIEW: CPT

## 2022-01-28 PROCEDURE — 81001 URINALYSIS AUTO W/SCOPE: CPT

## 2022-01-28 PROCEDURE — 86900 BLOOD TYPING SEROLOGIC ABO: CPT

## 2022-01-28 PROCEDURE — 97161 PT EVAL LOW COMPLEX 20 MIN: CPT

## 2022-01-28 PROCEDURE — 83036 HEMOGLOBIN GLYCOSYLATED A1C: CPT

## 2022-01-28 PROCEDURE — 84300 ASSAY OF URINE SODIUM: CPT

## 2022-01-28 PROCEDURE — 86850 RBC ANTIBODY SCREEN: CPT

## 2022-01-28 PROCEDURE — 85610 PROTHROMBIN TIME: CPT

## 2022-01-28 PROCEDURE — 80053 COMPREHEN METABOLIC PANEL: CPT

## 2022-01-28 PROCEDURE — 83735 ASSAY OF MAGNESIUM: CPT

## 2022-01-28 PROCEDURE — 86901 BLOOD TYPING SEROLOGIC RH(D): CPT

## 2022-01-28 PROCEDURE — 85027 COMPLETE CBC AUTOMATED: CPT

## 2022-01-28 PROCEDURE — 80061 LIPID PANEL: CPT

## 2022-01-28 PROCEDURE — 85025 COMPLETE CBC W/AUTO DIFF WBC: CPT

## 2022-01-28 PROCEDURE — 93306 TTE W/DOPPLER COMPLETE: CPT

## 2022-01-28 PROCEDURE — 70498 CT ANGIOGRAPHY NECK: CPT | Mod: MA

## 2022-01-28 PROCEDURE — 70450 CT HEAD/BRAIN W/O DYE: CPT | Mod: MA

## 2022-01-28 PROCEDURE — 85730 THROMBOPLASTIN TIME PARTIAL: CPT

## 2022-01-28 PROCEDURE — 99291 CRITICAL CARE FIRST HOUR: CPT

## 2022-01-28 PROCEDURE — 83605 ASSAY OF LACTIC ACID: CPT

## 2022-01-28 PROCEDURE — 0042T: CPT

## 2022-01-28 PROCEDURE — 82570 ASSAY OF URINE CREATININE: CPT

## 2022-01-28 PROCEDURE — 82009 KETONE BODYS QUAL: CPT

## 2022-01-28 PROCEDURE — 93005 ELECTROCARDIOGRAM TRACING: CPT

## 2022-01-28 PROCEDURE — 84484 ASSAY OF TROPONIN QUANT: CPT

## 2022-01-28 PROCEDURE — 36415 COLL VENOUS BLD VENIPUNCTURE: CPT

## 2022-01-28 PROCEDURE — 80048 BASIC METABOLIC PNL TOTAL CA: CPT

## 2022-01-28 PROCEDURE — 86803 HEPATITIS C AB TEST: CPT

## 2022-01-28 PROCEDURE — 87635 SARS-COV-2 COVID-19 AMP PRB: CPT

## 2022-01-28 RX ORDER — LOSARTAN POTASSIUM 100 MG/1
1 TABLET, FILM COATED ORAL
Qty: 60 | Refills: 0
Start: 2022-01-28 | End: 2022-02-26

## 2022-01-28 RX ORDER — ASPIRIN/CALCIUM CARB/MAGNESIUM 324 MG
1 TABLET ORAL
Qty: 21 | Refills: 0
Start: 2022-01-28 | End: 2022-02-17

## 2022-01-28 RX ORDER — INSULIN GLARGINE 100 [IU]/ML
32 INJECTION, SOLUTION SUBCUTANEOUS
Qty: 0 | Refills: 0 | DISCHARGE

## 2022-01-28 RX ORDER — INSULIN LISPRO 100/ML
8 VIAL (ML) SUBCUTANEOUS
Refills: 0 | Status: DISCONTINUED | OUTPATIENT
Start: 2022-01-28 | End: 2022-01-28

## 2022-01-28 RX ORDER — METOPROLOL TARTRATE 50 MG
1 TABLET ORAL
Qty: 0 | Refills: 0 | DISCHARGE

## 2022-01-28 RX ORDER — INSULIN LISPRO 100/ML
8 VIAL (ML) SUBCUTANEOUS
Qty: 1 | Refills: 0
Start: 2022-01-28

## 2022-01-28 RX ORDER — INSULIN GLARGINE 100 [IU]/ML
32 INJECTION, SOLUTION SUBCUTANEOUS AT BEDTIME
Refills: 0 | Status: DISCONTINUED | OUTPATIENT
Start: 2022-01-28 | End: 2022-01-28

## 2022-01-28 RX ORDER — INSULIN GLARGINE 100 [IU]/ML
0 INJECTION, SOLUTION SUBCUTANEOUS
Qty: 0 | Refills: 0 | DISCHARGE

## 2022-01-28 RX ORDER — ASPIRIN/CALCIUM CARB/MAGNESIUM 324 MG
1 TABLET ORAL
Qty: 0 | Refills: 0 | DISCHARGE

## 2022-01-28 RX ORDER — CLOPIDOGREL BISULFATE 75 MG/1
1 TABLET, FILM COATED ORAL
Qty: 30 | Refills: 1
Start: 2022-01-28 | End: 2022-03-28

## 2022-01-28 RX ORDER — LOSARTAN POTASSIUM 100 MG/1
25 TABLET, FILM COATED ORAL
Refills: 0 | Status: DISCONTINUED | OUTPATIENT
Start: 2022-01-28 | End: 2022-01-28

## 2022-01-28 RX ORDER — AMLODIPINE BESYLATE 2.5 MG/1
1 TABLET ORAL
Qty: 0 | Refills: 0 | DISCHARGE

## 2022-01-28 RX ORDER — LOSARTAN/HYDROCHLOROTHIAZIDE 100MG-25MG
1 TABLET ORAL
Qty: 0 | Refills: 0 | DISCHARGE

## 2022-01-28 RX ADMIN — Medication 81 MILLIGRAM(S): at 11:42

## 2022-01-28 RX ADMIN — LOSARTAN POTASSIUM 25 MILLIGRAM(S): 100 TABLET, FILM COATED ORAL at 11:42

## 2022-01-28 RX ADMIN — Medication 8 UNIT(S): at 17:36

## 2022-01-28 RX ADMIN — Medication 2: at 08:01

## 2022-01-28 RX ADMIN — LOSARTAN POTASSIUM 25 MILLIGRAM(S): 100 TABLET, FILM COATED ORAL at 17:36

## 2022-01-28 RX ADMIN — SODIUM CHLORIDE 80 MILLILITER(S): 9 INJECTION INTRAMUSCULAR; INTRAVENOUS; SUBCUTANEOUS at 05:47

## 2022-01-28 RX ADMIN — Medication 8 UNIT(S): at 11:30

## 2022-01-28 RX ADMIN — Medication 5: at 17:36

## 2022-01-28 RX ADMIN — POLYETHYLENE GLYCOL 3350 17 GRAM(S): 17 POWDER, FOR SOLUTION ORAL at 11:42

## 2022-01-28 RX ADMIN — Medication 4 UNIT(S): at 08:02

## 2022-01-28 RX ADMIN — Medication 4: at 11:29

## 2022-01-28 RX ADMIN — CLOPIDOGREL BISULFATE 75 MILLIGRAM(S): 75 TABLET, FILM COATED ORAL at 11:42

## 2022-01-28 NOTE — DISCHARGE NOTE PROVIDER - HOSPITAL COURSE
65 yo M with PMH of IDDM, HTN, HLD, CAD presenting with right sided weakness found to have left temporal lobe ischemia on CTA. No acute intervention or tpa indicated. No signs of hemorrhage.       Problem/Plan - 1:  ·  Problem: Left temporal lobe infarction.   ·  Plan: Patient presented with right sided weakness since 10:30am after waking. Went to be at midnight.  CT: microvascular disease, no acute hemorrhage or midline shift. Possible hypodensities in the gerald.  CTA shows left temporal lobe ischemia, no core infarct. Neg for hemorrhage.  Tele stroke: not a candidate for acute intervention, out of window for tpa  NIHSS: 4 on admission   Passed bed-side dysphagia screening  Trop -ve. EKG NSR  ASA, plavix, statin  Neuro Dr. Ahs was consulted: MRI:  acute infarct in the left paracentral gerald, with no associated hemorrhage or gross intraluminal thrombus in the vertebrobasilar system.  Will start Losartan, now   Fall precautions  Physical therapy consult: home  Dr. Mercado was consulted: f/u echo with bubble.     Problem/Plan - 2:  ·  Problem: SHARA (acute kidney injury).   ·  Plan: Creat 1.31> 1.25 no baseline  Denies kidney disease  f/u urine studies.     Problem/Plan - 3:  ·  Problem: Diabetes.   ·  Plan: pt takes metformin, lantus 20, and glipizide at home   Dr. Schafer was consulted: will increase lantus 32 units and admelog 8 units  no Glipizide on discharge   A1c 11.4.     Problem/Plan - 4:  ·  Problem: HTN (hypertension).   ·  Plan: Pt with hx of HTN, on metroprolol tartrate 100, amlodopine 5mg, losartan/hctz 100-25  /58  Will start Losartan.     Problem/Plan - 5:  ·  Problem: HLD (hyperlipidemia).   ·  Plan: Patient with HLD on atorvastatin 40mg  atorvastatin 80mg.     Problem/Plan - 6:  ·  Problem: CAD (coronary artery disease).   ·  Plan: Pt with hx of CAD on ASA  c/w ASA  Added plavix 75mg   lipitor to 40mg.     Problem/Plan - 7:  ·  Problem: Prophylactic measure.   ·  Plan: No sign of hemorrhage  Lovenox for DVT ppx.     67 yo M with PMH of IDDM, HTN, HLD, CAD presenting with right sided weakness found to have left temporal lobe ischemia on CTA. No acute intervention or tpa indicated. No signs of hemorrhage.      CVA: Patient presented with right sided weakness since 10:30am after waking. Left temporal lobe infarction,   CT: microvascular disease, no acute hemorrhage or midline shift. Possible hypodensities in the gerald.  CTA shows left temporal lobe ischemia, no core infarct. Neg for hemorrhage.  Tele stroke: not a candidate for acute intervention, out of window for tpa  NIHSS: 4 on admission   Passed bed-side dysphagia screening  Trop -ve. EKG NSR  ASA, plavix, statin  Neuro Dr. Ash was consulted: MRI:  acute infarct in the left paracentral gerald, with no associated hemorrhage or gross intraluminal thrombus in the vertebrobasilar system.  Will start Losartan, now   Fall precautions  Physical therapy consult: home  Dr. Mercado was consulted: f/u echo with bubble.     Problem/Plan - 2:  ·  Problem: SHARA (acute kidney injury).   ·  Plan: Creat 1.31> 1.25 no baseline  Denies kidney disease  f/u urine studies.     Problem/Plan - 3:  ·  Problem: Diabetes.   ·  Plan: pt takes metformin, lantus 20, and glipizide at home   Dr. Schafer was consulted: will increase lantus 32 units and admelog 8 units  no Glipizide on discharge   A1c 11.4.     Problem/Plan - 4:  ·  Problem: HTN (hypertension).   ·  Plan: Pt with hx of HTN, on metroprolol tartrate 100, amlodopine 5mg, losartan/hctz 100-25  /58  Will start Losartan.     Problem/Plan - 5:  ·  Problem: HLD (hyperlipidemia).   ·  Plan: Patient with HLD on atorvastatin 40mg  atorvastatin 80mg.     Problem/Plan - 6:  ·  Problem: CAD (coronary artery disease).   ·  Plan: Pt with hx of CAD on ASA  c/w ASA  Added plavix 75mg   lipitor to 40mg.     Problem/Plan - 7:  ·  Problem: Prophylactic measure.   ·  Plan: No sign of hemorrhage  Lovenox for DVT ppx.     65 yo M with PMH of IDDM, HTN, HLD, CAD presenting with right sided weakness found to have left temporal lobe ischemia on CTA. No acute intervention or tpa indicated. No signs of hemorrhage.      CVA: Patient presented with right sided weakness. CT showed microvascular disease, no acute hemorrhage or midline shift. Possible hypodensities in the gerald. CTA shows left temporal lobe ischemia, no core infarct. Neg for hemorrhage. Patient was out of window for tpa. Was started on ASA, Plavix and Statin. Neuro Dr. Ash was consulted, MRI showed acute infarct in the left paracentral gerald. ECHO was normal. Patient weakness improved but still remains to have slight right sided facial droop. Patient will be discharged on Aspirin, statin and Plavix, Since Pt has DM, Neurology is recommending Plavix + ASA for 21 days, then discontinue the ASA, but continue Plavix.     SHARA (acute kidney injury): Presented with  Creat 1.31> 1.25, likley pre renal resolved with fluids.     Diabetes: pt takes metformin, lantus 20, and glipizide at home. A1c was 11.4. Dr. Schafer was consulted: will increase lantus 32 units and admelog 8 units. no Glipizide on discharge   Dr. Schafer was consulted: will increase lantus 32 units and admelog 8 units    HTN (hypertension):  Pt with hx of HTN, on metroprolol tartrate 100, amlodopine 5mg, losartan/hctz 100-25, continue home meds  .  HLD (hyperlipidemia):  Patient with HLD on atorvastatin 40mg    Pt is stable for discharge. Pt has been advised to follow up as outpatient. Case has been discussed with the attending. This is just a summary of the case. For further information please refer to pt. chart document.         65 yo M with PMH of IDDM, HTN, HLD, CAD presenting with right sided weakness found to have left temporal lobe ischemia on CTA. No acute intervention or tpa indicated. No signs of hemorrhage.      CVA: Patient presented with right sided weakness. CT showed microvascular disease, no acute hemorrhage or midline shift. Possible hypodensities in the gerald. CTA shows left temporal lobe ischemia, no core infarct. Neg for hemorrhage. Patient was out of window for tpa. Was started on ASA, Plavix and Statin. Neuro Dr. Ash was consulted, MRI showed acute infarct in the left paracentral gerald. ECHO was normal. Patient weakness improved but still remains to have slight right sided facial droop. Patient will be discharged on Aspirin, statin and Plavix, Since Pt has DM, Neurology is recommending Plavix + ASA for 21 days, then discontinue the ASA, but continue Plavix.     SHARA (acute kidney injury): Presented with  Creat 1.31> 1.25, likley pre renal resolved with fluids.     Diabetes: pt takes metformin, lantus 20, and glipizide at home. A1c was 11.4. Dr. Schafer was consulted: will increase lantus 32 units and admelog 8 units. no Glipizide on discharge   Dr. Schafer was consulted: will increase lantus 32 units and admelog 8 units    HTN (hypertension): Patient will be d/c on Losartan 25mg BID  .  HLD (hyperlipidemia):  Patient with HLD on atorvastatin 40mg    Pt is stable for discharge. Pt has been advised to follow up as outpatient. Case has been discussed with the attending. This is just a summary of the case. For further information please refer to pt. chart document.

## 2022-01-28 NOTE — PROGRESS NOTE ADULT - PROBLEM SELECTOR PLAN 4
Pt with hx of HTN, on metroprolol tartrate 100, amlodopine 5mg, losartan/hctz 100-25  /58  Will start Losartan

## 2022-01-28 NOTE — PROGRESS NOTE ADULT - SUBJECTIVE AND OBJECTIVE BOX
Interval Events:  pt in nad    Allergies    No Known Allergies    Intolerances      Endocrine/Metabolic Medications:  atorvastatin 40 milliGRAM(s) Oral at bedtime  glucagon  Injectable 1 milliGRAM(s) IntraMuscular once  insulin glargine Injectable (LANTUS) 25 Unit(s) SubCutaneous at bedtime  insulin lispro (ADMELOG) corrective regimen sliding scale   SubCutaneous three times a day before meals  insulin lispro Injectable (ADMELOG) 4 Unit(s) SubCutaneous three times a day before meals      Vital Signs Last 24 Hrs  T(C): 36.5 (28 Jan 2022 11:06), Max: 36.8 (27 Jan 2022 11:38)  T(F): 97.7 (28 Jan 2022 11:06), Max: 98.2 (27 Jan 2022 11:38)  HR: 77 (28 Jan 2022 11:06) (66 - 79)  BP: 147/75 (28 Jan 2022 11:06) (130/79 - 180/98)  BP(mean): --  RR: 18 (28 Jan 2022 11:06) (17 - 19)  SpO2: 98% (28 Jan 2022 11:06) (98% - 100%)      PHYSICAL EXAM  All physical exam findings normal, except those marked:  General:	Alert, active, cooperative, NAD, well hydrated  .		[] Abnormal:  Neck		Normal: supple, no cervical adenopathy, no palpable thyroid  .		[] Abnormal:  Cardiovascular	Normal: regular rate, normal S1, S2, no murmurs  .		[] Abnormal:  Respiratory	Normal: no chest wall deformity, normal respiratory pattern, CTA B/L  .		[] Abnormal:  Abdominal	Normal: soft, ND, NT, bowel sounds present, no masses, no organomegaly  .		[] Abnormal:  		Normal normal genitalia, testes descended, circumcised/uncircumcised  .		Leonora stage:			Breast leonora:  .		Menstrual history:  .		[] Abnormal:  Extremities	Normal: FROM x4  .		[] Abnormal:  Skin		Normal: intact and not indurated, no rash, no acanthosis nigricans  .		[] Abnormal:  Neurologic	Normal: grossly intact  .		[] Abnormal:    LABS        CAPILLARY BLOOD GLUCOSE      POCT Blood Glucose.: 239 mg/dL (28 Jan 2022 07:52)  POCT Blood Glucose.: 270 mg/dL (27 Jan 2022 21:54)  POCT Blood Glucose.: 315 mg/dL (27 Jan 2022 20:38)  POCT Blood Glucose.: 295 mg/dL (27 Jan 2022 16:37)  POCT Blood Glucose.: 293 mg/dL (27 Jan 2022 12:05)        Assesment/plan  The patient is a 67 yo M with PMH of IDDM, HTN, HLD, CAD presenting with right sided weakness   Found to have uncont dm. Pt takes basaglar 20-25, glipizide and metformin as out pt with poorly controlled dm- fsg 150- 250 as out pt       Problem/Recommendation - 1:  ·  Problem: Uncontrolled diabetes mellitus.   remains hyperglycemic  change basaglar to 32 units  and admelog 8 ac tid  fsg ac and hs  a1c level- 11.4% ( 13 a few months ago per son at [facility])  d/w pt need for basal/bolus tx  nutrition eval.  d/w pts son and prim team      Problem/Recommendation - 2:  ·  Problem: Left temporal lobe infarction.   ·  Recommendation: cont tx per neuro  better dm/bp control.

## 2022-01-28 NOTE — DISCHARGE NOTE PROVIDER - NSDCMRMEDTOKEN_GEN_ALL_CORE_FT
amLODIPine 5 mg oral tablet: 1 tab(s) orally once a day  Aspir 81 oral delayed release tablet: 1 tab(s) orally once a day  atorvastatin 40 mg oral tablet: 1 tab(s) orally once a day  Basaglar KwikPen 100 units/mL subcutaneous solution: 20 units before bed  glipiZIDE 5 mg oral tablet: 1 tab(s) orally once a day  losartan-hydrochlorothiazide 100 mg-25 mg oral tablet: 1 tab(s) orally once a day  metFORMIN 1000 mg oral tablet, extended release: 1 tab(s) orally once a day  Metoprolol Tartrate 100 mg oral tablet: 1 tab(s) orally 2 times a day   Admelog SoloStar 100 units/mL injectable solution: 8 unit(s) injectable 3 times a day (before meals)   Aspir 81 oral delayed release tablet: 1 tab(s) orally once a day  atorvastatin 40 mg oral tablet: 1 tab(s) orally once a day  Basaglar KwikPen 100 units/mL subcutaneous solution: 32 unit(s) subcutaneous once a day (at bedtime)  clopidogrel 75 mg oral tablet: 1 tab(s) orally once a day  losartan 25 mg oral tablet: 1 tab(s) orally 2 times a day  metFORMIN 1000 mg oral tablet, extended release: 1 tab(s) orally once a day

## 2022-01-28 NOTE — DISCHARGE NOTE NURSING/CASE MANAGEMENT/SOCIAL WORK - NSCORESITESY/N_GEN_A_CORE_RD
Patient Education     Diverticulitis    Some people get pouches along the wall of the colon as they get older. The pouches, called diverticuli, usually cause no symptoms. If the pouches become blocked, you can get an infection. This infection is called diverticulitis. It causes pain in your lower abdomen and fever. If not treated, it can become a serious condition, causing an abscess to form inside the pouch. The abscess may block the intestinal tract even or rupture, spreading infection throughout the abdomen.  When treatment is started early, oral antibiotics alone may be enough to cure diverticulitis. This method is tried first. But, if you don't improve or if your condition gets worse while you are trying oral antibiotics, you may need to be admitted to the hospital for IV antibiotics. Severe cases may require surgery.  Home care  The following guidelines will help you care for yourself at home:  · During the acute illness, rest and follow a low-fiber diet. Include foods like:  ¨ Flake cereal, mashed potatoes, pancakes, waffles, pasta, white bread, rice, applesauce, bananas, eggs, meat, fish, poultry, tofu, and cooked vegetables  · Take antibiotics exactly as the doctor says. Don't miss any doses or stop taking the medication, even if you feel better.  · Monitor your temperature and report any rising temperatures to your doctor.  Preventing future attacks  Once you have had an episode of diverticulitis, you are at risk for having it again. After you have recovered from this episode, you may be able to reduce your risk by eating a high-fiber diet (20-35 gm/day of fiber). This cleans out the colon pouches that already exist and prevents new ones from forming. Foods high in fiber include fresh fruits and edible peelings, raw or lightly cooked vegetables, whole grain cereals and breads, dried beans and peas, and bran.  Other steps that can help prevent future attacks include:  · Take your medications, such as  antibiotics, as the doctor says.  · Drink 6 to 8 glasses of water every day, unless directed otherwise.  · Use a heating pad or hot water bottle to reduce abdominal cramping or pain.  · Begin an exercise program. Ask your doctor how to get started. You can benefit from simple activities such as walking or gardening.  · Treat diarrhea with a bland diet. Start with liquids only; then slowly add fiber over time.  · Watch for changes in your bowel movements (constipation to diarrhea).  · Get plenty of rest and sleep.  Follow-up care  Follow up with your doctor as advised or sooner if you are not getting better in the next 2 days.  When to seek medical care  Get prompt medical attention if any of the following occur:  · Fever of 100.4°F (38°C) or higher, or as directed by your health care provider  · Repeated vomiting or swelling of the abdomen  · Weakness, dizziness, light-headedness  · Pain in your abdomen that gets worse, severe, or spreads to your back  · Pain that moves to the right lower abdomen  · Rectal bleeding (stools that are red, black or maroon color)  · Unexpected vaginal bleeding  © 9234-5820 [employer]. [address] [place] [address]. All rights reserved. This information is not intended as a substitute for professional medical care. Always follow your healthcare professional's instructions.         Ciprofloxacin 500 mg taking 1 tablet twice a day with food until all taken for 10 days    Metronidazole 500 mg twice a day with food and no alcohol until all taken    Follow low fiber diet for the next 3 or 4 days until seen by primary care physician.  If symptoms persist or worsen go directly to the emergency room. Please do not come to urgent care as we cannot help you any further.   Yes

## 2022-01-28 NOTE — DISCHARGE NOTE NURSING/CASE MANAGEMENT/SOCIAL WORK - PATIENT PORTAL LINK FT
You can access the FollowMyHealth Patient Portal offered by [facility] by registering at the following website: [email or web address]. By joining [facility]’s FollowMyHealth portal, you will also be able to view your health information using other applications (apps) compatible with our system.

## 2022-01-28 NOTE — PROGRESS NOTE ADULT - PROBLEM SELECTOR PLAN 1
Patient presented with right sided weakness since 10:30am after waking. Went to be at midnight.  CT: microvascular disease, no acute hemorrhage or midline shift. Possible hypodensities in the gerald.  CTA shows left temporal lobe ischemia, no core infarct. Neg for hemorrhage.  Tele stroke: not a candidate for acute intervention, out of window for tpa  NIHSS: 4 on admission   Passed bed-side dysphagia screening  Trop -ve. EKG NSR  ASA, plavix, statin  Neuro Dr. Ash was consulted: MRI:  acute infarct in the left paracentral gerald, with no associated hemorrhage or gross intraluminal thrombus in the vertebrobasilar system.  Will start Losartan, now   Fall precautions  Physical therapy consult: home  Dr. Mercado was consulted: f/u echo with bubble

## 2022-01-28 NOTE — PROGRESS NOTE ADULT - SUBJECTIVE AND OBJECTIVE BOX
NEUROLOGY FOLLOW-UP NOTE    NAME:  ALEXANDRA CLEMENTS      ASSESSMENT:  66 RHM with right facial droop and right-sided hemiparesis, secondary to acute left MCA territory ischemic stroke      RECOMMENDATIONS:    1. Stroke workup  - Transthoracic Echocardiogram  - Telemetry monitoring while inpatient    2. Secondary stroke prevention  - Q4H Neurochecks & Vital signs  - Clopidogrel 75mg daily (maintain long-term due to presence of diabetes)  - Aspirin 81mg daily x 21 days  - Atorvastatin 40mg QHS  - Treat BP if over 140/90 (goal /80) - No role for permissive hypertension at this time  - Diabetes management as per primary team  - PT/OT  - DVT ppx: SCDs, Enoxaparin    3. Follow-up in Neurology clinic - Recommend initial hospital follow-up at [facility] in [place] [phone]) with Ashley Her NP or Lesley Mendoza NP to ensure close appointment within 3 weeks, then patient can follow up routinely in [place] [facility] or [facility].        NOTE TO BE COMPLETED - PLEASE REFER TO ABOVE ONLY AND IGNORE INFORMATION BELOW    ******************************    HPI:  The patient is a 67 yo M with PMH of IDDM, HTN, HLD, CAD presenting with right sided weakness since 10:30am this morning. The patient felt malaise and general weakness last night around 10:00 pm, went to sleep around midnight and woke-up at 10:30am. Since waking he noticed difficulty ambulating due to right leg weakness. He also noticed right arm weakness. Around 1:30pm his son noticed slurred speech prompting him to come to the ED. Patient has history of angiogram more than 10 years ago but denies stent. Patient denies headache, LOC, chest pain, shortness of breath, LE swelling.    In ED: s/p 325 ASA and 40mg lipitor. CTA shows 22 mL of active ischemia in anterior left temporal lobe. Tele stroke consulted. Patient not a candidate for acute intervention. Out of window for tpa.   (26 Jan 2022 18:21)      NEURO HPI:      INTERVAL HISTORY:      MEDICATIONS:  aspirin enteric coated 81 milliGRAM(s) Oral daily  atorvastatin 40 milliGRAM(s) Oral at bedtime  clopidogrel Tablet 75 milliGRAM(s) Oral daily  dextrose 5%. 1000 milliLiter(s) IV Continuous <Continuous>  enoxaparin Injectable 40 milliGRAM(s) SubCutaneous at bedtime  glucagon  Injectable 1 milliGRAM(s) IntraMuscular once  insulin glargine Injectable (LANTUS) 25 Unit(s) SubCutaneous at bedtime  insulin lispro (ADMELOG) corrective regimen sliding scale   SubCutaneous three times a day before meals  insulin lispro Injectable (ADMELOG) 4 Unit(s) SubCutaneous three times a day before meals  polyethylene glycol 3350 17 Gram(s) Oral daily  senna 2 Tablet(s) Oral at bedtime  sodium chloride 0.9%. 1000 milliLiter(s) IV Continuous <Continuous>      ALLERGIES:  No Known Allergies      REVIEW OF SYSTEMS:  Fourteen systems reviewed and negative except as in HPI / Interval History.        OBJECTIVE:  Vital Signs Last 24 Hrs  T(C): 36.7 (28 Jan 2022 08:02), Max: 36.8 (27 Jan 2022 11:38)  T(F): 98.1 (28 Jan 2022 08:02), Max: 98.2 (27 Jan 2022 11:38)  HR: 66 (28 Jan 2022 08:02) (66 - 79)  BP: 172/73 (28 Jan 2022 08:02) (130/79 - 180/98)  BP(mean): --  RR: 17 (28 Jan 2022 08:02) (17 - 19)  SpO2: 98% (28 Jan 2022 08:02) (98% - 100%)    General Examination:  General: No acute distress  HEENT: Atraumatic, Normocephalic  Respiratory: CTA B/l.  No crackles, rhonchi, or wheezes.  Cardiovascular: RRR.  Normal S1 & S2.  Normal b/l radial and pedal pulses.    Neurological Examination:  General / Mental Status: AAO x 3.  No aphasia or dysarthria.  Naming and repetition intact.  Cranial Nerves: VFF x 4.  PERRL.  EOMI x 2, No nystagmus or diplopia.  B/l V1-V3 equal and intact to light touch and pinprick.  Symmetric facial movement and palate elevation.  B/l hearing equal to finger rub.  5/5 strength with b/l sternocleidomastoid & trapezius.  Midline tongue protrusion, with no atrophy or fasciculations.  Motor: Normal bulk & tone in all four extremities.  5/5 strength throughout all four extremities.  No downward drift, rigidity, spasticity, or tremors in any of the four extremities.  Sensory: Intact to light touch and pinprick in all four extremities.  Negative Romberg.  Reflex: 2+ and symmetric at b/l biceps, triceps, brachioradialis, patellae, and ankles.  Downgoing toes b/l.  Coordination: No dysmetria with b/l finger-to-nose and heel raise tests.  Symmetric rapid alternating movements b/l.  Gait: Normal, narrow-based gait.  No difficulty with tiptoe, heel, and tandem gaits.        LABORATORY VALUES:                          13.5   10.21 )-----------( 223      ( 27 Jan 2022 07:18 )             39.7       01-27    136  |  99  |  17  ----------------------------<  290<H>  3.7   |  29  |  1.25    Ca    9.1      27 Jan 2022 07:18  Mg     2.2     01-26    TPro  7.0  /  Alb  3.0<L>  /  TBili  0.4  /  DBili  x   /  AST  11  /  ALT  14  /  AlkPhos  120  01-26 01-26 Chol 152 LDL 61 HDL 37<L> Trig 268<H>    A1C with Estimated Average Glucose (01.27.22 @ 09:38)   A1C with Estimated Average Glucose Result: 11.4%   Estimated Average Glucose: 280 mg/dL     Glucose Trend  01-28-22 @ 07:52   -  -- -- 239<H>  01-27-22 @ 21:54   -  -- -- 270<H>  01-27-22 @ 20:38   -  -- -- 315<H>  01-27-22 @ 16:37   -  -- -- 295<H>  01-27-22 @ 12:05   -  -- -- 293<H>  01-27-22 @ 08:02   -  -- -- 274<H>  01-27-22 @ 07:18   -  -- 290<H> --  01-26-22 @ 20:56   -  -- -- 337<H>  01-26-22 @ 15:39   -  -- -- 256<H>  01-26-22 @ 15:24   -  -- 280<H> --          NEUROIMAGING:      CT Head & CTA Head/Neck & CT Perfusion Head (1/26/22):  - No acute intracranial structural abnormality  - Left PCA stenosis  - No other intracranial or neck vessel abnormality  - Possibly hypoperfusion in anterior left temporal lobe      MRI Brain (1/27/22):  - Acute left paracentral gerald infarct  - No other acute intracranial abnormality          Please contact the Neurology consult service with any neurological questions.      Cm Ash MD   of Neurology  [facility] at [facility]         NEUROLOGY FOLLOW-UP NOTE    NAME:  ALEXANDRA CLEMENTS      ASSESSMENT:  66 RHM with right facial droop and right-sided hemiparesis, secondary to acute left MCA territory ischemic stroke      RECOMMENDATIONS:    1. Stroke workup  - Transthoracic Echocardiogram - Complete and unremarkable, no medication changes indicated  - Telemetry monitoring while inpatient    2. Secondary stroke prevention  - Q4H Neurochecks & Vital signs  - Clopidogrel 75mg daily (maintain long-term due to presence of diabetes)  - Aspirin 81mg daily x 21 days  - Atorvastatin 40mg QHS  - Treat BP if over 140/90 (goal /80) - No role for permissive hypertension at this time  - Diabetes management as per primary team  - PT/OT  - DVT ppx: SCDs, Enoxaparin    3. Follow-up in Neurology clinic - Recommend initial hospital follow-up at Stroke Clinic in [place] [phone]) with Ashley Her NP or Lesley Mendoza NP to ensure close appointment within 3 weeks, then patient can follow up routinely in [place] [facility] or [facility].        ******************************    HPI:  The patient is a 67 yo M with PMH of IDDM, HTN, HLD, CAD presenting with right sided weakness since 10:30am this morning. The patient felt malaise and general weakness last night around 10:00 pm, went to sleep around midnight and woke-up at 10:30am. Since waking he noticed difficulty ambulating due to right leg weakness. He also noticed right arm weakness. Around 1:30pm his son noticed slurred speech prompting him to come to the ED. Patient has history of angiogram more than 10 years ago but denies stent. Patient denies headache, LOC, chest pain, shortness of breath, LE swelling. In ED: s/p 325 ASA and 40mg atorvastatin. CTA shows 22 mL of active ischemia in anterior left temporal lobe. Tele stroke consulted. Patient not a candidate for acute intervention. Out of window for tpa. (26 Jan 2022 18:21)      NEURO HPI:  66 RHM who states that he is okay now, but that he had right-sided weakness earlier today that made it difficulty for him to walk. He was last seen normal by family at 22:30 last night.      INTERVAL HISTORY:  The patient continues to have right facial droop and right-sided hemiparesis, but he is able to walk without needing an assistive device.      MEDICATIONS:  aspirin enteric coated 81 milliGRAM(s) Oral daily  atorvastatin 40 milliGRAM(s) Oral at bedtime  clopidogrel Tablet 75 milliGRAM(s) Oral daily  dextrose 5%. 1000 milliLiter(s) IV Continuous <Continuous>  enoxaparin Injectable 40 milliGRAM(s) SubCutaneous at bedtime  glucagon  Injectable 1 milliGRAM(s) IntraMuscular once  insulin glargine Injectable (LANTUS) 25 Unit(s) SubCutaneous at bedtime  insulin lispro (ADMELOG) corrective regimen sliding scale   SubCutaneous three times a day before meals  insulin lispro Injectable (ADMELOG) 4 Unit(s) SubCutaneous three times a day before meals  polyethylene glycol 3350 17 Gram(s) Oral daily  senna 2 Tablet(s) Oral at bedtime  sodium chloride 0.9%. 1000 milliLiter(s) IV Continuous <Continuous>      ALLERGIES:  No Known Allergies      REVIEW OF SYSTEMS:  Fourteen systems reviewed and negative except as in HPI / Interval History.        OBJECTIVE:  Vital Signs Last 24 Hrs  T(C): 36.7 (28 Jan 2022 08:02), Max: 36.8 (27 Jan 2022 11:38)  T(F): 98.1 (28 Jan 2022 08:02), Max: 98.2 (27 Jan 2022 11:38)  HR: 66 (28 Jan 2022 08:02) (66 - 79)  BP: 172/73 (28 Jan 2022 08:02) (130/79 - 180/98)  RR: 17 (28 Jan 2022 08:02) (17 - 19)  SpO2: 98% (28 Jan 2022 08:02) (98% - 100%)    General Examination:  General: No acute distress  HEENT: Atraumatic, Normocephalic  Respiratory: CTA B/l.  No crackles, rhonchi, or wheezes.  Cardiovascular: RRR.  Normal S1 & S2.  Normal b/l radial and pedal pulses.    Neurological Examination:  General / Mental Status: AAO x 3.  No aphasia or dysarthria.  Naming and repetition intact.  Cranial Nerves: VFF x 4.  PERRL.  EOMI x 2, No nystagmus or diplopia.  B/l V1-V3 equal and intact to light touch and pinprick.  Right lower facial droop present.  Symmetric palate elevation present.  B/l hearing equal to finger rub.  5/5 strength with b/l sternocleidomastoid & trapezius.  Midline tongue protrusion, with no atrophy or fasciculations.  Motor: Normal bulk & tone in all four extremities.  At least 4/5 strength throughout right upper extremity, without downward drift, and with full right hand  strength.  At least 3/5 strength throughout right lower extremity, with downward drift.  5/5 strength left upper extremity and left lower extremity, without downward drift in either, and with full left hand  strength.  No rigidity, spasticity, or tremors in any of the four extremities.  Sensory: Intact to light touch and pinprick in all four extremities.  Reflex: 1+ and symmetric at b/l biceps, triceps, brachioradialis, patellae, and ankles.  Downgoing toes b/l.  Coordination: No dysmetria with b/l finger-to-nose and heel raise tests.  Gait and Romberg sign testing deferred due to right-sided hemiparesis.    NIHSS Score:    LOC - 0  LOC Questions - 0  LOC Commands - 0  Gaze Preference - 0  Visual Fields - 0  Facial Palsy - 2  Motor Arm Left - 0  Motor Arm Right - 0  Motor Leg Left - 0  Motor Leg Right - 1  Limb Ataxia - 0  Sensory - 0  Language - 0  Speech - 0  Extinction - 0    NIHSS Score Total: 3 - No IV tPA because patient presented outside the time window    Modified Frankford Scale: 2        LABORATORY VALUES:                          13.5   10.21 )-----------( 223      ( 27 Jan 2022 07:18 )             39.7       01-27    136  |  99  |  17  ----------------------------<  290<H>  3.7   |  29  |  1.25    Ca    9.1      27 Jan 2022 07:18  Mg     2.2     01-26    TPro  7.0  /  Alb  3.0<L>  /  TBili  0.4  /  DBili  x   /  AST  11  /  ALT  14  /  AlkPhos  120  01-26 01-26 Chol 152 LDL 61 HDL 37<L> Trig 268<H>    A1C with Estimated Average Glucose (01.27.22 @ 09:38)   A1C with Estimated Average Glucose Result: 11.4%   Estimated Average Glucose: 280 mg/dL     Glucose Trend  01-28-22 @ 07:52   -  -- -- 239<H>  01-27-22 @ 21:54   -  -- -- 270<H>  01-27-22 @ 20:38   -  -- -- 315<H>  01-27-22 @ 16:37   -  -- -- 295<H>  01-27-22 @ 12:05   -  -- -- 293<H>  01-27-22 @ 08:02   -  -- -- 274<H>  01-27-22 @ 07:18   -  -- 290<H> --  01-26-22 @ 20:56   -  -- -- 337<H>  01-26-22 @ 15:39   -  -- -- 256<H>  01-26-22 @ 15:24   -  -- 280<H> --          NEUROIMAGING:      CT Head & CTA Head/Neck & CT Perfusion Head (1/26/22):  - No acute intracranial structural abnormality  - Left PCA stenosis  - No other intracranial or neck vessel abnormality  - Possibly hypoperfusion in anterior left temporal lobe      MRI Brain (1/27/22):  - Acute left paracentral gerald infarct  - No other acute intracranial abnormality      Echo (1/27/22):  - LVEF 56%  - Trace tricuspid regurgitation  - No cardiac thrombus or intracardiac shunt identified        Please contact the Neurology consult service with any neurological questions.      Cm Ash MD   of Neurology  [facility] at [facility]

## 2022-01-28 NOTE — DISCHARGE NOTE NURSING/CASE MANAGEMENT/SOCIAL WORK - NSDCVIVACCINE_GEN_ALL_CORE_FT
Tdap; 28-Jan-2018 19:58; Luis Fitzgerald (RN); [employer]; [id number]; IntraMuscular; Deltoid Right.; 0.5 milliLiter(s); VIS (VIS Published: 09-May-2013, VIS Presented: 28-Jan-2018);

## 2022-01-28 NOTE — PROGRESS NOTE ADULT - ASSESSMENT
65 yo M with PMH of IDDM, HTN, HLD, CAD presenting with right sided weakness since 10:30am this morning,acute CVA and uncontrolled DM.  1.Tele monitoring.  2.Echocardiogram with bubble study.  3.DM-Insulin.Endo f/u.  4.HTN-start cozaar 25mg bid.  5.Neurology f/u noted,lacunar infarct.  6.Lipid d/o-statin.  7.CAD-asa,plavix,statin.  8.GI and DVT prophylaxis.  9.Stress test as outpatient 4-6 weeks.
The patient is a 65 yo M with PMH of IDDM, HTN, HLD, CAD presenting with right sided weakness found to have left temporal lobe ischemia on CTA. No acute intervention or tpa indicated. No signs of hemorrhage. 
The patient is a 65 yo M with PMH of IDDM, HTN, HLD, CAD presenting with right sided weakness found to have left temporal lobe ischemia on CTA. No acute intervention or tpa indicated. No signs of hemorrhage.

## 2022-01-28 NOTE — PROGRESS NOTE ADULT - PROBLEM SELECTOR PLAN 2
Creat 1.31> 1.25 no baseline  Denies kidney disease  f/u urine studies
Monitor BP  Cont meds
Creat 1.31> 1.25 no baseline  Denies kidney disease  f/u urine studies

## 2022-01-28 NOTE — DISCHARGE NOTE NURSING/CASE MANAGEMENT/SOCIAL WORK - NSDCPEFALRISK_GEN_ALL_CORE
For information on Fall & Injury Prevention, visit: [email or web address] OR  [email or web address] OR  https://www.cdc.gov/steadi/patient.html

## 2022-01-28 NOTE — DISCHARGE NOTE PROVIDER - NSDCCPCAREPLAN_GEN_ALL_CORE_FT
PRINCIPAL DISCHARGE DIAGNOSIS  Diagnosis: Cerebrovascular accident (CVA)  Assessment and Plan of Treatment: You presented with right sided weakness. CT showed microvascular disease, and MRI showed  acute infarct in the left paracentral gerald. You were not given any tPA which is a medication that breaks down clots due to not being within the  time range from when this medications can be given, You were started on ASA, Plavix and AtorvaStatin. Neuro Dr. Ash was consulted. You also had an ECHO that  was normal. You will be discharged on Aspirin, atorvastatin and Plavix. Neurology is recommending you take Plavix and aspirin for 21 days, then discontinue the ASA after 21 days.  You will continue to take Plavix and atorvastatin. Follow-up in Neurology clinic - Recommend initial hospital follow-up at [facility] in [place] [phone]) with Ashley Her NP or Lesley Mendoza NP  within 3 weeks.      SECONDARY DISCHARGE DIAGNOSES  Diagnosis: Diabetes  Assessment and Plan of Treatment: You will be discharged on Lantus 32 units at bedtime and Admelog 8 units three times a day before meals. We discontinued your glipizide. . HbA1C on admission was 11.4 . This is a reflection of your blood sugar level over the last 3 months.  Please check your blood sugar levels twice daily - Morning/Afternoon, and Lunch/Bedtime the following day. Keep a record of your blood sugar readings  You must maintain a healthy diet that consists of low sugar and low fat. Your diet must consist of mostly vegetables. Please limit your intake of high sugar and high carbohydrate foods such as pasta, rice, and bread. Exercise frequently if possible. Follow up with primary care physician within one week of your discharge to further manage your diabetes and monitor your Hemoglobin A1c levels after 3 months to evaluate your diabetes control. Please follow up with Dr. Schafer outpatient.       Diagnosis: HTN (hypertension)  Assessment and Plan of Treatment: You have high blood pressure. Please continue to taking your medications as prescribed. Please measure your blood pressure at least once daily. Maintain a healthy diet which includes incorporating more vegetables and decreasing the amount of salt (low sodium) and sugar in your diet such as rice, bread, and pasta low sugar, low fat, low sodium diet. Exercise frequently if possible.  Please follow up with your primary care provider within one week of your discharge.       PRINCIPAL DISCHARGE DIAGNOSIS  Diagnosis: Cerebrovascular accident (CVA)  Assessment and Plan of Treatment: You presented with right sided weakness. CT showed microvascular disease, and MRI showed  acute infarct in the left paracentral gerald. You were not given any tPA which is a medication that breaks down clots due to not being within the  time range from when this medications can be given, You were started on ASA, Plavix and AtorvaStatin. Neuro Dr. Ash was consulted. You also had an ECHO that  was normal. You will be discharged on Aspirin, atorvastatin and Plavix. Neurology is recommending you take Plavix and aspirin for 21 days, then discontinue the ASA after 21 days.  You will continue to take Plavix and atorvastatin. Follow-up in Neurology clinic - Recommend initial hospital follow-up at [facility] in [place] [phone]) with Ashley Her NP or Lesley Mendoza NP  within 3 weeks.      SECONDARY DISCHARGE DIAGNOSES  Diagnosis: Diabetes  Assessment and Plan of Treatment: You will be discharged on Lantus 32 units at bedtime and Admelog 8 units three times a day before meals. We discontinued your glipizide. . HbA1C on admission was 11.4 . This is a reflection of your blood sugar level over the last 3 months.  Please check your blood sugar levels twice daily - Morning/Afternoon, and Lunch/Bedtime the following day. Keep a record of your blood sugar readings  You must maintain a healthy diet that consists of low sugar and low fat. Your diet must consist of mostly vegetables. Please limit your intake of high sugar and high carbohydrate foods such as pasta, rice, and bread. Exercise frequently if possible. Follow up with primary care physician within one week of your discharge to further manage your diabetes and monitor your Hemoglobin A1c levels after 3 months to evaluate your diabetes control. Please follow up with Dr. Schafer outpatient.       Diagnosis: HTN (hypertension)  Assessment and Plan of Treatment: You have high blood pressure. We discontuned your amlidopine, metoprolol, and Please continue to taking your medications as prescribed. Please measure your blood pressure at least once daily. Maintain a healthy diet which includes incorporating more vegetables and decreasing the amount of salt (low sodium) and sugar in your diet such as rice, bread, and pasta low sugar, low fat, low sodium diet. Exercise frequently if possible.  Please follow up with your primary care provider within one week of your discharge.       PRINCIPAL DISCHARGE DIAGNOSIS  Diagnosis: Cerebrovascular accident (CVA)  Assessment and Plan of Treatment: You presented with right sided weakness. CT showed microvascular disease, and MRI showed  acute infarct in the left paracentral gerald. You were not given any tPA which is a medication that breaks down clots due to not being within the  time range from when this medications can be given, You were started on ASA, Plavix and AtorvaStatin. Neuro Dr. Ash was consulted. You also had an ECHO that  was normal. You will be discharged on Aspirin, atorvastatin and Plavix. Neurology is recommending you take Plavix and aspirin for 21 days, then discontinue the ASA after 21 days.  You will continue to take Plavix and atorvastatin. Follow-up in Neurology clinic - Recommend initial hospital follow-up at [facility] in [place] [phone]) with Ashley Her NP or Lesley Mendoza NP  within 3 weeks.      SECONDARY DISCHARGE DIAGNOSES  Diagnosis: Diabetes  Assessment and Plan of Treatment: You will be discharged on Lantus 32 units at bedtime and Admelog 8 units three times a day before meals. We discontinued your glipizide. . HbA1C on admission was 11.4 . This is a reflection of your blood sugar level over the last 3 months.  Please check your blood sugar levels twice daily - Morning/Afternoon, and Lunch/Bedtime the following day. Keep a record of your blood sugar readings  You must maintain a healthy diet that consists of low sugar and low fat. Your diet must consist of mostly vegetables. Please limit your intake of high sugar and high carbohydrate foods such as pasta, rice, and bread. Exercise frequently if possible. Follow up with primary care physician within one week of your discharge to further manage your diabetes and monitor your Hemoglobin A1c levels after 3 months to evaluate your diabetes control. Please follow up with Dr. Schafer outpatient.       Diagnosis: HTN (hypertension)  Assessment and Plan of Treatment: You have high blood pressure. We discontuned your amlidopine, metoprolol, and losartan/hctz. We started you on Losartan 25mg BID.  Please continue to taking your medications as prescribed. Please measure your blood pressure at least once daily. Maintain a healthy diet which includes incorporating more vegetables and decreasing the amount of salt (low sodium) and sugar in your diet such as rice, bread, and pasta low sugar, low fat, low sodium diet. Exercise frequently if possible.  Please follow up with your primary care provider within one week of your discharge.

## 2022-01-28 NOTE — PROGRESS NOTE ADULT - SUBJECTIVE AND OBJECTIVE BOX
pt seen in icu [  ], reg med floor [   ], bed [  ], chair at bedside [   ]  Awake, alert, comfortable in bed in NAD.    REVIEW OF SYSTEMS:    CONSTITUTIONAL: No weakness, fevers or chills  EYES/ENT: No visual changes;  No vertigo or throat pain   NECK: No pain or stiffness  RESPIRATORY: No cough, wheezing, hemoptysis; No shortness of breath  CARDIOVASCULAR: No chest pain or palpitations  GASTROINTESTINAL: No abdominal or epigastric pain. No nausea, vomiting, or hematemesis; No diarrhea or constipation. No melena or hematochezia.  GENITOURINARY: No dysuria, frequency or hematuria  NEUROLOGICAL: No numbness or weakness  SKIN: No itching, burning, rashes, or lesions   All other review of systems is negative unless indicated above.    Physical Exam    General: WN/WD NAD  Neurology: A&Ox3, nonfocal, HANNA x 4  Respiratory: CTA B/L  CV: RRR, S1S2, no murmurs, rubs or gallops  Abdominal: Soft, NT, ND +BS, Last BM  Extremities: No edema, + peripheral pulses      Allergies  No Known Allergies      Health Issues  Cerebral infarction    HTN (hypertension)    DM (diabetes mellitus)    Hypercholesterolemia    CAD (coronary artery disease)    No significant past surgical history        Vitals  T(F): 96.6 (01-28-22 @ 04:44), Max: 98.2 (01-27-22 @ 11:38)  HR: 70 (01-28-22 @ 04:44) (68 - 79)  BP: 180/98 (01-28-22 @ 04:44) (130/79 - 180/98)  RR: 19 (01-28-22 @ 04:44) (18 - 19)  SpO2: 99% (01-28-22 @ 04:44) (99% - 100%)  Wt(kg): --  CAPILLARY BLOOD GLUCOSE      POCT Blood Glucose.: 239 mg/dL (28 Jan 2022 07:52)      Labs                          13.5   10.21 )-----------( 223      ( 27 Jan 2022 07:18 )             39.7       01-27    136  |  99  |  17  ----------------------------<  290<H>  3.7   |  29  |  1.25    Ca    9.1      27 Jan 2022 07:18  Mg     2.2     01-26    TPro  7.0  /  Alb  3.0<L>  /  TBili  0.4  /  DBili  x   /  AST  11  /  ALT  14  /  AlkPhos  120  01-26            Radiology Results      Meds    MEDICATIONS  (STANDING):  aspirin enteric coated 81 milliGRAM(s) Oral daily  atorvastatin 40 milliGRAM(s) Oral at bedtime  clopidogrel Tablet 75 milliGRAM(s) Oral daily  dextrose 5%. 1000 milliLiter(s) (100 mL/Hr) IV Continuous <Continuous>  enoxaparin Injectable 40 milliGRAM(s) SubCutaneous at bedtime  glucagon  Injectable 1 milliGRAM(s) IntraMuscular once  insulin glargine Injectable (LANTUS) 25 Unit(s) SubCutaneous at bedtime  insulin lispro (ADMELOG) corrective regimen sliding scale   SubCutaneous three times a day before meals  insulin lispro Injectable (ADMELOG) 4 Unit(s) SubCutaneous three times a day before meals  polyethylene glycol 3350 17 Gram(s) Oral daily  senna 2 Tablet(s) Oral at bedtime  sodium chloride 0.9%. 1000 milliLiter(s) (80 mL/Hr) IV Continuous <Continuous>      MEDICATIONS  (PRN):         pt seen in icu [  ], reg med floor [ x  ], bed [ x ], chair at bedside [   ]  Awake, alert, comfortable in bed in NAD.    REVIEW OF SYSTEMS:    CONSTITUTIONAL: No weakness, fevers or chills  EYES/ENT: No visual changes;  No vertigo or throat pain   NECK: No pain or stiffness  RESPIRATORY: No cough, wheezing, hemoptysis; No shortness of breath  CARDIOVASCULAR: No chest pain or palpitations  GASTROINTESTINAL: No abdominal or epigastric pain. No nausea, vomiting, or hematemesis; No diarrhea or constipation. No melena or hematochezia.  GENITOURINARY: No dysuria, frequency or hematuria  NEUROLOGICAL: No numbness or weakness  SKIN: No itching, burning, rashes, or lesions   All other review of systems is negative unless indicated above.    Physical Exam    General: WN/WD NAD  Neurology: A&Ox3, nonfocal, HANNA x 4  Respiratory: CTA B/L  CV: RRR, S1S2, no murmurs, rubs or gallops  Abdominal: Soft, NT, ND +BS, Last BM  Extremities: No edema, + peripheral pulses      Allergies  No Known Allergies      Health Issues  Cerebral infarction    HTN (hypertension)    DM (diabetes mellitus)    Hypercholesterolemia    CAD (coronary artery disease)    No significant past surgical history        Vitals  T(F): 96.6 (01-28-22 @ 04:44), Max: 98.2 (01-27-22 @ 11:38)  HR: 70 (01-28-22 @ 04:44) (68 - 79)  BP: 180/98 (01-28-22 @ 04:44) (130/79 - 180/98)  RR: 19 (01-28-22 @ 04:44) (18 - 19)  SpO2: 99% (01-28-22 @ 04:44) (99% - 100%)  Wt(kg): --  CAPILLARY BLOOD GLUCOSE      POCT Blood Glucose.: 239 mg/dL (28 Jan 2022 07:52)      Labs                          13.5   10.21 )-----------( 223      ( 27 Jan 2022 07:18 )             39.7       01-27    136  |  99  |  17  ----------------------------<  290<H>  3.7   |  29  |  1.25    Ca    9.1      27 Jan 2022 07:18  Mg     2.2     01-26    TPro  7.0  /  Alb  3.0<L>  /  TBili  0.4  /  DBili  x   /  AST  11  /  ALT  14  /  AlkPhos  120  01-26            Radiology Results    < from: MR Head No Cont (01.27.22 @ 15:32) >  IMPRESSION:    1)  The MR study confirms an acute infarct in the left paracentral gerald,   with no associated hemorrhage or gross intraluminal thrombus in the   vertebrobasilar system.  2)  the brain is otherwise unremarkable in appearance. No acute   supratentorial abnormality noted.      < end of copied text >    Meds    MEDICATIONS  (STANDING):  aspirin enteric coated 81 milliGRAM(s) Oral daily  atorvastatin 40 milliGRAM(s) Oral at bedtime  clopidogrel Tablet 75 milliGRAM(s) Oral daily  dextrose 5%. 1000 milliLiter(s) (100 mL/Hr) IV Continuous <Continuous>  enoxaparin Injectable 40 milliGRAM(s) SubCutaneous at bedtime  glucagon  Injectable 1 milliGRAM(s) IntraMuscular once  insulin glargine Injectable (LANTUS) 25 Unit(s) SubCutaneous at bedtime  insulin lispro (ADMELOG) corrective regimen sliding scale   SubCutaneous three times a day before meals  insulin lispro Injectable (ADMELOG) 4 Unit(s) SubCutaneous three times a day before meals  polyethylene glycol 3350 17 Gram(s) Oral daily  senna 2 Tablet(s) Oral at bedtime  sodium chloride 0.9%. 1000 milliLiter(s) (80 mL/Hr) IV Continuous <Continuous>      MEDICATIONS  (PRN):

## 2022-01-28 NOTE — PROGRESS NOTE ADULT - SUBJECTIVE AND OBJECTIVE BOX
CHIEF COMPLAINT:Patient is a 66y old  Male who presents with a chief complaint of Stroke.Pt appears comfortable.    	  REVIEW OF SYSTEMS:  CONSTITUTIONAL: No fever, weight loss, or fatigue  EYES: No eye pain, visual disturbances, or discharge  ENT:  No difficulty hearing, tinnitus, vertigo; No sinus or throat pain  NECK: No pain or stiffness  RESPIRATORY: No cough, wheezing, chills or hemoptysis; No Shortness of Breath  CARDIOVASCULAR: No chest pain, palpitations, passing out, dizziness, or leg swelling  GASTROINTESTINAL: No abdominal or epigastric pain. No nausea, vomiting, or hematemesis; No diarrhea or constipation. No melena or hematochezia.  GENITOURINARY: No dysuria, frequency, hematuria, or incontinence  NEUROLOGICAL: No headaches, memory loss, loss of strength, numbness, or tremors  SKIN: No itching, burning, rashes, or lesions   LYMPH Nodes: No enlarged glands  ENDOCRINE: No heat or cold intolerance; No hair loss  MUSCULOSKELETAL: No joint pain or swelling; No muscle, back, or extremity pain  PSYCHIATRIC: No depression, anxiety, mood swings, or difficulty sleeping  HEME/LYMPH: No easy bruising, or bleeding gums  ALLERGY AND IMMUNOLOGIC: No hives or eczema	        PHYSICAL EXAM:  T(C): 36.7 (01-28-22 @ 08:02), Max: 36.8 (01-27-22 @ 11:38)  HR: 66 (01-28-22 @ 08:02) (66 - 79)  BP: 172/73 (01-28-22 @ 08:02) (130/79 - 180/98)  RR: 17 (01-28-22 @ 08:02) (17 - 19)  SpO2: 98% (01-28-22 @ 08:02) (98% - 100%)  Wt(kg): --  I&O's Summary      Appearance: Normal	  HEENT:   Normal oral mucosa, PERRL, EOMI	  Lymphatic: No lymphadenopathy  Cardiovascular: Normal S1 S2, No JVD, No murmurs, No edema  Respiratory: Lungs clear to auscultation	  Psychiatry: A & O x 3, Mood & affect appropriate  Gastrointestinal:  Soft, Non-tender, + BS	  Skin: No rashes, No ecchymoses, No cyanosis	  Neurologic: Non-focal  Extremities: Normal range of motion, No clubbing, cyanosis or edema  Vascular: Peripheral pulses palpable 2+ bilaterally    MEDICATIONS  (STANDING):  aspirin enteric coated 81 milliGRAM(s) Oral daily  atorvastatin 40 milliGRAM(s) Oral at bedtime  clopidogrel Tablet 75 milliGRAM(s) Oral daily  dextrose 5%. 1000 milliLiter(s) (100 mL/Hr) IV Continuous <Continuous>  enoxaparin Injectable 40 milliGRAM(s) SubCutaneous at bedtime  glucagon  Injectable 1 milliGRAM(s) IntraMuscular once  insulin glargine Injectable (LANTUS) 25 Unit(s) SubCutaneous at bedtime  insulin lispro (ADMELOG) corrective regimen sliding scale   SubCutaneous three times a day before meals  insulin lispro Injectable (ADMELOG) 4 Unit(s) SubCutaneous three times a day before meals  losartan 25 milliGRAM(s) Oral two times a day  polyethylene glycol 3350 17 Gram(s) Oral daily  senna 2 Tablet(s) Oral at bedtime  sodium chloride 0.9%. 1000 milliLiter(s) (80 mL/Hr) IV Continuous <Continuous>      TELEMETRY: nsr	      	  LABS:	 	      Troponin I, High Sensitivity Result: 6.4 ng/L (01-26 @ 15:24)                            13.5   10.21 )-----------( 223      ( 27 Jan 2022 07:18 )             39.7     01-27    136  |  99  |  17  ----------------------------<  290<H>  3.7   |  29  |  1.25    Ca    9.1      27 Jan 2022 07:18  Mg     2.2     01-26    TPro  7.0  /  Alb  3.0<L>  /  TBili  0.4  /  DBili  x   /  AST  11  /  ALT  14  /  AlkPhos  120  01-26      Lipid Profile: Cholesterol 152  LDL --  HDL 37    Ldl calc 61  Ratio --    HgA1c: glyA1C with Estimated Average Glucose (01.27.22 @ 09:38)   A1C with Estimated Average Glucose Result: 11.4: Method: Immunoassay   Reference Range 4.0-5.6%       	    m< from: MR Head No Cont (01.27.22 @ 15:32) >  ACC: [id number] EXAM:  MR BRAIN                          PROCEDURE DATE:  01/27/2022          INTERPRETATION:  INDICATION:    Stroke. TIA.  TECHNIQUE:  Multiplanar brain imaging was conducted. T1, T2 and FLAIR   imaging was performed.  In addition, diffusion imaging, diffusion   coefficient assessment (ADC) and T2* was incorporated . No contrast   administered.  COMPARISON EXAMINATION:  CT dated 1/26/2022    FINDINGS:    HEMISPHERES: No supratentorial diffusion restriction or acute ischemia is   noted that. No hemorrhagic lesion or mass identified.  VENTRICLES:  midline and normal in size  POSTERIOR FOSSA:  There is diffusion restriction indicative of an acute   infarct in the left paracentral gerald. There is no associated hemorrhage.   Flow is maintained in the vertebrobasilar system. There is corresponding   increased T2 signal.  EXTRA-AXIAL:  No mass or collections are depicted.  VASCULATURE:  The major vessels and venous sinuses are grossly patent.  SINUSES AND MASTOIDS:  Mild mucosal thickening noted in sinuses. Mastoids   are clear.  MISCELLANEOUS:  No orbital or pituitary abnormality noted.  No skull base   lesion suggested.    IMPRESSION:    1)  The MR study confirms an acute infarct in the left paracentral gerald,   with no associated hemorrhage or gross intraluminal thrombus in the   vertebrobasilar system.  2)  the brain is otherwise unremarkable in appearance. No acute   supratentorial abnormality noted.    --- End of Report ---            JULY FORRESTER MD; Attending Radiologist  This document has been electronically signed. Jan 27 2022  2:38PM

## 2022-01-28 NOTE — PROGRESS NOTE ADULT - PROBLEM SELECTOR PLAN 3
pt takes metformin, lantus 20, and glipizide at home   Dr. Schafer was consulted: will increase lantus 32 units and admelog 8 units  no Glipizide on discharge   A1c 11.4

## 2022-01-28 NOTE — PROGRESS NOTE ADULT - TIME BILLING
I counseled the patient about his test results, and the appropriate medications to take for secondary stroke prevention.

## 2022-01-28 NOTE — PROGRESS NOTE ADULT - SUBJECTIVE AND OBJECTIVE BOX
PGY-1 Progress Note discussed with attending    PAGER #: [[phone]] TILL 5:00 PM  PLEASE CONTACT ON CALL TEAM:  - On Call Team (Please refer to Kyle) FROM 5:00 PM - 8:30PM  - Nightfloat Team FROM 8:30 -7:30 AM        INTERVAL HPI/OVERNIGHT EVENTS: no acute overnight events      REVIEW OF SYSTEMS:  negative unless stated above      Vital Signs Last 24 Hrs  T(C): 36.5 (28 Jan 2022 11:06), Max: 36.8 (27 Jan 2022 11:38)  T(F): 97.7 (28 Jan 2022 11:06), Max: 98.2 (27 Jan 2022 11:38)  HR: 77 (28 Jan 2022 11:06) (66 - 79)  BP: 147/75 (28 Jan 2022 11:06) (130/79 - 180/98)  BP(mean): --  RR: 18 (28 Jan 2022 11:06) (17 - 19)  SpO2: 98% (28 Jan 2022 11:06) (98% - 100%)    PHYSICAL EXAMINATION:  GENERAL: NAD, rught facial droop   HEAD:  Atraumatic, Normocephalic  EYES:  conjunctiva and sclera clear  NECK: Supple, No JVD, Normal thyroid  CHEST/LUNG: Clear to auscultation. Clear to percussion bilaterally; No rales, rhonchi, wheezing, or rubs  HEART: Regular rate and rhythm; No murmurs, rubs, or gallops  ABDOMEN: Soft, Nontender, Nondistended; Bowel sounds present  NERVOUS SYSTEM:  Alert & Oriented X3,  bilateral upper and lower ext strength intact   EXTREMITIES:  2+ Peripheral Pulses, No clubbing, cyanosis, or edema  SKIN: warm dry                          13.5   10.21 )-----------( 223      ( 27 Jan 2022 07:18 )             39.7     01-27    136  |  99  |  17  ----------------------------<  290<H>  3.7   |  29  |  1.25    Ca    9.1      27 Jan 2022 07:18  Mg     2.2     01-26    TPro  7.0  /  Alb  3.0<L>  /  TBili  0.4  /  DBili  x   /  AST  11  /  ALT  14  /  AlkPhos  120  01-26    LIVER FUNCTIONS - ( 26 Jan 2022 15:24 )  Alb: 3.0 g/dL / Pro: 7.0 g/dL / ALK PHOS: 120 U/L / ALT: 14 U/L DA / AST: 11 U/L / GGT: x               PT/INR - ( 26 Jan 2022 15:24 )   PT: 12.0 sec;   INR: 1.01 ratio         PTT - ( 26 Jan 2022 15:24 )  PTT:34.7 sec    CAPILLARY BLOOD GLUCOSE      RADIOLOGY & ADDITIONAL TESTS:

## 2022-02-02 PROBLEM — I25.10 ATHEROSCLEROTIC HEART DISEASE OF NATIVE CORONARY ARTERY WITHOUT ANGINA PECTORIS: Chronic | Status: ACTIVE | Noted: 2022-01-26

## 2022-02-02 PROBLEM — E78.00 PURE HYPERCHOLESTEROLEMIA, UNSPECIFIED: Chronic | Status: ACTIVE | Noted: 2022-01-26

## 2022-02-23 ENCOUNTER — APPOINTMENT (OUTPATIENT)
Dept: NEUROLOGY | Facility: CLINIC | Age: 67
End: 2022-02-23
Payer: MEDICARE

## 2022-02-23 VITALS — OXYGEN SATURATION: 98 % | WEIGHT: 181 LBS | BODY MASS INDEX: 28.35 KG/M2 | TEMPERATURE: 97.8 F | HEART RATE: 72 BPM

## 2022-02-23 VITALS — SYSTOLIC BLOOD PRESSURE: 228 MMHG | DIASTOLIC BLOOD PRESSURE: 92 MMHG

## 2022-02-23 VITALS — DIASTOLIC BLOOD PRESSURE: 91 MMHG | SYSTOLIC BLOOD PRESSURE: 208 MMHG

## 2022-02-23 DIAGNOSIS — Z78.9 OTHER SPECIFIED HEALTH STATUS: ICD-10-CM

## 2022-02-23 DIAGNOSIS — Z87.891 PERSONAL HISTORY OF NICOTINE DEPENDENCE: ICD-10-CM

## 2022-02-23 DIAGNOSIS — Z82.49 FAMILY HISTORY OF ISCHEMIC HEART DISEASE AND OTHER DISEASES OF THE CIRCULATORY SYSTEM: ICD-10-CM

## 2022-02-23 DIAGNOSIS — E11.9 TYPE 2 DIABETES MELLITUS W/OUT COMPLICATIONS: ICD-10-CM

## 2022-02-23 DIAGNOSIS — I67.89 OTHER CEREBROVASCULAR DISEASE: ICD-10-CM

## 2022-02-23 DIAGNOSIS — I10 ESSENTIAL (PRIMARY) HYPERTENSION: ICD-10-CM

## 2022-02-23 DIAGNOSIS — E78.5 HYPERLIPIDEMIA, UNSPECIFIED: ICD-10-CM

## 2022-02-23 DIAGNOSIS — Z83.3 FAMILY HISTORY OF DIABETES MELLITUS: ICD-10-CM

## 2022-02-23 DIAGNOSIS — I63.9 CEREBRAL INFARCTION, UNSPECIFIED: ICD-10-CM

## 2022-02-23 PROCEDURE — 99215 OFFICE O/P EST HI 40 MIN: CPT

## 2022-02-23 RX ORDER — TICAGRELOR 90 MG/1
90 TABLET ORAL
Refills: 0 | Status: DISCONTINUED | COMMUNITY
End: 2022-02-23

## 2022-02-23 RX ORDER — DILTIAZEM HYDROCHLORIDE 30 MG/1
30 TABLET ORAL
Refills: 0 | Status: DISCONTINUED | COMMUNITY
End: 2022-02-23

## 2022-02-23 RX ORDER — TAMSULOSIN HYDROCHLORIDE 0.4 MG/1
0.4 CAPSULE ORAL
Refills: 0 | Status: DISCONTINUED | COMMUNITY
End: 2022-02-23

## 2022-02-23 RX ORDER — CILOSTAZOL 100 MG/1
TABLET ORAL
Refills: 0 | Status: DISCONTINUED | COMMUNITY
End: 2022-02-23

## 2022-02-23 RX ORDER — INSULIN GLARGINE 100 [IU]/ML
100 INJECTION, SOLUTION SUBCUTANEOUS AT BEDTIME
Qty: 1 | Refills: 0 | Status: ACTIVE | COMMUNITY
Start: 2022-02-23

## 2022-02-23 RX ORDER — INSULIN ASPART 100 [IU]/ML
100 INJECTION, SOLUTION INTRAVENOUS; SUBCUTANEOUS 3 TIMES DAILY
Qty: 1 | Refills: 0 | Status: ACTIVE | COMMUNITY
Start: 2022-02-23

## 2022-02-23 RX ORDER — FEXOFENADINE HYDROCHLORIDE 180 MG/1
180 TABLET ORAL
Refills: 0 | Status: DISCONTINUED | COMMUNITY
End: 2022-02-23

## 2022-02-23 RX ORDER — LISINOPRIL 20 MG/1
20 TABLET ORAL
Refills: 0 | Status: DISCONTINUED | COMMUNITY
End: 2022-02-23

## 2022-02-23 RX ORDER — FENOFIBRATE 145 MG/1
TABLET ORAL
Refills: 0 | Status: DISCONTINUED | COMMUNITY
End: 2022-02-23

## 2022-02-23 RX ORDER — METFORMIN HYDROCHLORIDE 1000 MG/1
1000 TABLET, COATED ORAL
Qty: 60 | Refills: 0 | Status: ACTIVE | COMMUNITY
Start: 2022-02-23

## 2022-02-23 RX ORDER — MONTELUKAST SODIUM 10 MG/1
10 TABLET, FILM COATED ORAL
Refills: 0 | Status: DISCONTINUED | COMMUNITY
End: 2022-02-23

## 2022-02-23 RX ORDER — CLOPIDOGREL BISULFATE 75 MG/1
75 TABLET, FILM COATED ORAL DAILY
Qty: 30 | Refills: 0 | Status: ACTIVE | COMMUNITY
Start: 2022-02-23

## 2022-02-23 RX ORDER — ATORVASTATIN CALCIUM 40 MG/1
40 TABLET, FILM COATED ORAL
Qty: 30 | Refills: 2 | Status: ACTIVE | COMMUNITY

## 2022-02-23 RX ORDER — LOSARTAN POTASSIUM 25 MG/1
25 TABLET, FILM COATED ORAL DAILY
Qty: 1 | Refills: 0 | Status: ACTIVE | COMMUNITY
Start: 2022-02-23

## 2022-02-25 PROBLEM — I63.9 LEFT PONTINE STROKE: Status: ACTIVE | Noted: 2022-02-25

## 2022-02-25 PROBLEM — I67.89 CEREBRAL MICROVASCULAR DISEASE: Status: ACTIVE | Noted: 2022-02-25

## 2022-02-28 NOTE — PHYSICAL EXAM
[General Appearance - Alert] : alert [General Appearance - In No Acute Distress] : in no acute distress [Oriented To Time, Place, And Person] : oriented to person, place, and time [Impaired Insight] : insight and judgment were intact [Affect] : the affect was normal [Person] : oriented to person [Place] : oriented to place [Time] : oriented to time [Concentration Intact] : normal concentrating ability [Visual Intact] : visual attention was ~T not ~L decreased [Fluency] : fluency intact [Comprehension] : comprehension intact [Cranial Nerves Oculomotor (III)] : extraocular motion intact [Cranial Nerves Optic (II)] : visual acuity intact bilaterally,  visual fields full to confrontation, pupils equal round and reactive to light [Cranial Nerves Trigeminal (V)] : facial sensation intact symmetrically [Cranial Nerves Vestibulocochlear (VIII)] : hearing was intact bilaterally [Cranial Nerves Glossopharyngeal (IX)] : tongue and palate midline [Cranial Nerves Accessory (XI - Cranial And Spinal)] : head turning and shoulder shrug symmetric [Cranial Nerves Hypoglossal (XII)] : there was no tongue deviation with protrusion [Motor Tone] : muscle tone was normal in all four extremities [Motor Strength] : muscle strength was normal in all four extremities [No Muscle Atrophy] : normal bulk in all four extremities [Motor Handedness Right-Handed] : the patient is right hand dominant [4] : hip flexion 4/5 [5] : ankle plantar flexion 5/5 [Sensation Tactile Decrease] : light touch was intact [Abnormal Walk] : normal gait [Balance] : balance was intact [2+] : Ankle jerk left 2+ [Sensation Pain / Temperature Decrease] : pain and temperature was intact [Flattening Of Nasolabial Fold On The Right] : flattening of the nasolabial fold [Sclera] : the sclera and conjunctiva were normal [PERRL With Normal Accommodation] : pupils were equal in size, round, reactive to light, with normal accommodation [Extraocular Movements] : extraocular movements were intact [Outer Ear] : the ears and nose were normal in appearance [Oropharynx] : the oropharynx was normal [Neck Appearance] : the appearance of the neck was normal [Auscultation Breath Sounds / Voice Sounds] : lungs were clear to auscultation bilaterally [Heart Rate And Rhythm] : heart rate was normal and rhythm regular [Heart Sounds] : normal S1 and S2 [Arterial Pulses Carotid] : carotid pulses were normal with no bruits [Full Pulse] : the pedal pulses are present [Abdomen Soft] : soft [Abdomen Tenderness] : non-tender [No CVA Tenderness] : no ~M costovertebral angle tenderness [No Spinal Tenderness] : no spinal tenderness [Nail Clubbing] : no clubbing  or cyanosis of the fingernails [Musculoskeletal - Swelling] : no joint swelling seen [Skin Color & Pigmentation] : normal skin color and pigmentation [] : no rash [FreeTextEntry1] : NIHSS:\par \par LOC Responsiveness: 0\par LOC Questions: 0\par LOC Commands: 0\par Gaze Preference: 0\par Visual Field: 0\par Facial Palsy: 1\par Motor Arm Right: 0\par Motor Arm Left: 0\par Motor Leg Right: 1\par Motor Leg Left: 0\par Limb Ataxia: 0\par Sensory: 0\par Language: 0\par Speech: 0\par Extinction/Inattention: 0\par \par NIHSS Total: 2\par \par Modified James Scale: 2\par  [Flattening Of Nasolabial Fold On The Left] : no flattening of the nasolabial fold [Paresis Pronator Drift Right-Sided] : no pronator drift on the right [Paresis Pronator Drift Left-Sided] : no pronator drift on the left [Hand Weakness Right] : normal hand  [Hand Weakness Left] : normal hand  [Romberg's Sign] : Romberg's sign was negtive [Past-pointing] : there was no past-pointing [Tremor] : no tremor present [Coordination - Dysmetria Impaired Finger-to-Nose Bilateral] : not present [Coordination - Dysmetria Impaired Heel-to-Shin Bilateral] : not present [Plantar Reflex Right Only] : normal on the right [Plantar Reflex Left Only] : normal on the left [FreeTextEntry8] : Normal, narrow-based gait. No difficulty with tiptoe, heel, and tandem gaits.

## 2022-02-28 NOTE — ASSESSMENT
[FreeTextEntry1] : 66 RHF with right nasolabial fold flattening and right-sided hemiparesis secondary to left paracentral pontine ischemic stroke, likely secondary to chronic cerebral microvascular disease in the setting of hypertension, hyperlipidemia, and diabetes mellitus type 2.

## 2022-02-28 NOTE — REASON FOR VISIT
[id number] Exp Problem Focused - Low Complex [Post Hospitalization] : a post hospitalization visit [Other: _____] : [unfilled] [FreeTextEntry1] : Stroke

## 2022-02-28 NOTE — HISTORY OF PRESENT ILLNESS
[FreeTextEntry1] : This is a 66-year-old right-handed man who was admitted to [facility] for right-sided hemiparesis secondary to left paracentral pontine stroke, in the setting of cerebral microvascular disease. Since his discharge, he continues to have some right leg weakness, but it has improved. He denies any other new stroke-like symptoms.

## 2022-02-28 NOTE — DATA REVIEWED
[de-identified] : MRI Brain ([facility], 1/27/22):\par - Acute left paracentral gerald infarct\par - Chronic microvascular changes [de-identified] : CT Head & CTA Head/Neck & CTP Head (1/26/22):\par - Possible gerald hypodensities; No other acute intracranial structural abnormalities\par - Chronic microvascular changes\par - Diffuse atrophy\par - No large vessel cutoff or intracranial/neck vessel stenoses\par - Small area of anterior left temporal hypoperfusion\par \par Labs ([facility], 1/26/22 - 1/27/22):\par - Lipid panel: Chol 152 | Trigl 268 <H> | HDL 37 <L> | LDL 61\par - HbA1c 11.4% <H>, Glc 280 <H>

## 2022-07-06 ENCOUNTER — APPOINTMENT (OUTPATIENT)
Dept: NEUROLOGY | Facility: CLINIC | Age: 67
End: 2022-07-06

## 2022-09-11 ENCOUNTER — EMERGENCY (EMERGENCY)
Facility: HOSPITAL | Age: 67
LOS: 1 days | Discharge: HOME | End: 2022-09-11
Attending: STUDENT IN AN ORGANIZED HEALTH CARE EDUCATION/TRAINING PROGRAM
Payer: COMMERCIAL

## 2022-09-11 VITALS
TEMPERATURE: 97 F | DIASTOLIC BLOOD PRESSURE: 74 MMHG | OXYGEN SATURATION: 99 % | WEIGHT: 169.98 LBS | HEIGHT: 65 IN | HEART RATE: 70 BPM | RESPIRATION RATE: 18 BRPM | SYSTOLIC BLOOD PRESSURE: 178 MMHG

## 2022-09-11 LAB
ALBUMIN SERPL ELPH-MCNC: 3 G/DL — LOW (ref 3.5–5)
ALP SERPL-CCNC: 132 U/L — HIGH (ref 40–120)
ALT FLD-CCNC: 19 U/L DA — SIGNIFICANT CHANGE UP (ref 10–60)
ANION GAP SERPL CALC-SCNC: 9 MMOL/L — SIGNIFICANT CHANGE UP (ref 5–17)
APPEARANCE UR: SIGNIFICANT CHANGE UP
AST SERPL-CCNC: 22 U/L — SIGNIFICANT CHANGE UP (ref 10–40)
BACTERIA # UR AUTO: ABNORMAL /HPF
BASOPHILS # BLD AUTO: 0.04 K/UL — SIGNIFICANT CHANGE UP (ref 0–0.2)
BASOPHILS NFR BLD AUTO: 0.3 % — SIGNIFICANT CHANGE UP (ref 0–2)
BILIRUB SERPL-MCNC: 0.3 MG/DL — SIGNIFICANT CHANGE UP (ref 0.2–1.2)
BILIRUB UR-MCNC: NEGATIVE — SIGNIFICANT CHANGE UP
BUN SERPL-MCNC: 50 MG/DL — HIGH (ref 7–18)
CALCIUM SERPL-MCNC: 8.8 MG/DL — SIGNIFICANT CHANGE UP (ref 8.4–10.5)
CHLORIDE SERPL-SCNC: 101 MMOL/L — SIGNIFICANT CHANGE UP (ref 96–108)
CO2 SERPL-SCNC: 23 MMOL/L — SIGNIFICANT CHANGE UP (ref 22–31)
COLOR SPEC: YELLOW — SIGNIFICANT CHANGE UP
CREAT SERPL-MCNC: 2.18 MG/DL — HIGH (ref 0.5–1.3)
DIFF PNL FLD: ABNORMAL
EGFR: 32 ML/MIN/1.73M2 — LOW
EOSINOPHIL # BLD AUTO: 0.14 K/UL — SIGNIFICANT CHANGE UP (ref 0–0.5)
EOSINOPHIL NFR BLD AUTO: 0.9 % — SIGNIFICANT CHANGE UP (ref 0–6)
EPI CELLS # UR: ABNORMAL /HPF
GLUCOSE SERPL-MCNC: 317 MG/DL — HIGH (ref 70–99)
GLUCOSE UR QL: 1000 MG/DL
HCT VFR BLD CALC: 39 % — SIGNIFICANT CHANGE UP (ref 39–50)
HGB BLD-MCNC: 13.1 G/DL — SIGNIFICANT CHANGE UP (ref 13–17)
IMM GRANULOCYTES NFR BLD AUTO: 0.3 % — SIGNIFICANT CHANGE UP (ref 0–1.5)
KETONES UR-MCNC: NEGATIVE — SIGNIFICANT CHANGE UP
LACTATE SERPL-SCNC: 2.9 MMOL/L — HIGH (ref 0.7–2)
LEUKOCYTE ESTERASE UR-ACNC: NEGATIVE — SIGNIFICANT CHANGE UP
LIDOCAIN IGE QN: 519 U/L — HIGH (ref 73–393)
LYMPHOCYTES # BLD AUTO: 1.4 K/UL — SIGNIFICANT CHANGE UP (ref 1–3.3)
LYMPHOCYTES # BLD AUTO: 9.4 % — LOW (ref 13–44)
MCHC RBC-ENTMCNC: 29.5 PG — SIGNIFICANT CHANGE UP (ref 27–34)
MCHC RBC-ENTMCNC: 33.6 GM/DL — SIGNIFICANT CHANGE UP (ref 32–36)
MCV RBC AUTO: 87.8 FL — SIGNIFICANT CHANGE UP (ref 80–100)
MONOCYTES # BLD AUTO: 0.56 K/UL — SIGNIFICANT CHANGE UP (ref 0–0.9)
MONOCYTES NFR BLD AUTO: 3.8 % — SIGNIFICANT CHANGE UP (ref 2–14)
NEUTROPHILS # BLD AUTO: 12.66 K/UL — HIGH (ref 1.8–7.4)
NEUTROPHILS NFR BLD AUTO: 85.3 % — HIGH (ref 43–77)
NITRITE UR-MCNC: NEGATIVE — SIGNIFICANT CHANGE UP
NRBC # BLD: 0 /100 WBCS — SIGNIFICANT CHANGE UP (ref 0–0)
PH UR: 5 — SIGNIFICANT CHANGE UP (ref 5–8)
PLATELET # BLD AUTO: 222 K/UL — SIGNIFICANT CHANGE UP (ref 150–400)
POTASSIUM SERPL-MCNC: 4.7 MMOL/L — SIGNIFICANT CHANGE UP (ref 3.5–5.3)
POTASSIUM SERPL-SCNC: 4.7 MMOL/L — SIGNIFICANT CHANGE UP (ref 3.5–5.3)
PROT SERPL-MCNC: 7.1 G/DL — SIGNIFICANT CHANGE UP (ref 6–8.3)
PROT UR-MCNC: 500 MG/DL
RBC # BLD: 4.44 M/UL — SIGNIFICANT CHANGE UP (ref 4.2–5.8)
RBC # FLD: 11.8 % — SIGNIFICANT CHANGE UP (ref 10.3–14.5)
RBC CASTS # UR COMP ASSIST: >50 /HPF (ref 0–2)
SODIUM SERPL-SCNC: 133 MMOL/L — LOW (ref 135–145)
SP GR SPEC: 1.02 — SIGNIFICANT CHANGE UP (ref 1.01–1.02)
URATE CRY FLD QL MICRO: ABNORMAL /HPF
UROBILINOGEN FLD QL: NEGATIVE — SIGNIFICANT CHANGE UP
WBC # BLD: 14.85 K/UL — HIGH (ref 3.8–10.5)
WBC # FLD AUTO: 14.85 K/UL — HIGH (ref 3.8–10.5)
WBC UR QL: SIGNIFICANT CHANGE UP /HPF (ref 0–5)

## 2022-09-11 PROCEDURE — 74176 CT ABD & PELVIS W/O CONTRAST: CPT | Mod: 26,MA

## 2022-09-11 PROCEDURE — 99285 EMERGENCY DEPT VISIT HI MDM: CPT

## 2022-09-11 RX ORDER — SODIUM CHLORIDE 9 MG/ML
1000 INJECTION INTRAMUSCULAR; INTRAVENOUS; SUBCUTANEOUS ONCE
Refills: 0 | Status: COMPLETED | OUTPATIENT
Start: 2022-09-11 | End: 2022-09-11

## 2022-09-11 RX ADMIN — SODIUM CHLORIDE 1000 MILLILITER(S): 9 INJECTION INTRAMUSCULAR; INTRAVENOUS; SUBCUTANEOUS at 22:43

## 2022-09-11 RX ADMIN — SODIUM CHLORIDE 1000 MILLILITER(S): 9 INJECTION INTRAMUSCULAR; INTRAVENOUS; SUBCUTANEOUS at 19:23

## 2022-09-11 NOTE — ED ADULT TRIAGE NOTE - CHIEF COMPLAINT QUOTE
Patient:   CARLOS UREÑA            MRN: [id number]            FIN: [id number]              Age:   79 years     Sex:  FEMALE     :  37   Associated Diagnoses:   Chronic systolic HF (heart failure)   Author:   JANICE CONNORS     Basic Information   Source of history:  Self, Medical record.    History limitation:  None.      Chief Complaint   Due to COVID precautions this visit done over phone.  Patient consented to phone visit, and is in their home.  For follow up appointment. Feels good just tired and depressed.  Being treated for depression by PCP.    in hospice care for cancer in .  Had been  for 60 years.  No chest pain, pressure or dizziness. No SOB. Sleeps with 3 pillows.   Weighs self daily. Weight stable.  Appetite improving.   Following sodium restriction.  Taking all medications.   Checks BP daily, states BP has been good considering circumstances.  Follows with DR Kelly on  Sept 10  Phone visit with Dr Watson on  does not have follow up visit scheduled.  PMH:  systolic heart failure, ischemic cardiomyopathy, NSTEMI s/p stents LAD 2018, s/p bioprosthetic mitral valve replacement  [facility], HTN, CAD MI , dyslipidemia, COPD, diet cntrolled diabetes, irritable bowel syndrome  Discharged from the hospital on 2018 for systolic heart failure, NSTEMI post PCI/stents LAD, ischemic cardiomyopathy with moderately severe LV disfunction  Echo 2018 EF 33%.   Completed cardiac rehab phase 2. Still dances,  Social history:  lives alone, very active, works part time at [employer], active participant in the [employer], competitive ballroom dancer, occasional alcohol, no smoking  PCP: Dr. Kelly  Cardiologist:  Dr. Watson  Pulm:  Javad  Cardiac Catheterization:  2018 s/p PCI stent to mid and Proximal LAD; EF 30%  Last Echo: 19  SURENDRA mild MR, mod thick leaflets, posterior leaflet prolapsed  19  EF 67%, mode MR, thickened  leaflets, mild TR   10/8/18 EF 50-55%                   7/18/2018 EF 33%  Last hospitalization: 7/21/18     Review of Systems   Constitutional:  Negative except as documented in history of present illness.   Eye:  Negative.    Ear/Nose/Mouth/Throat:  Negative.    Respiratory:  Negative.    Cardiovascular:  Negative.    Gastrointestinal:  Negative.    Genitourinary:  Negative.    Hematology/Lymphatics:  Negative.    Endocrine:  Negative.    Immunologic:  Negative.    Musculoskeletal:  Negative.    Integumentary:  Negative.    Neurologic:  Negative.    Psychiatric:  Negative except as documented in history of present illness.      Health Status   Allergies:    Allergies reviewed.   Current medications:  (Selected)   Prescriptions  Prescribed  NIFEdipine oral 30 mg XL tablet: 30 mg = 1 tab, Oral, Daily, Tab XL, # 30 tab, 6 Refills, Maintenance, Pharmacy: [facility]/pharmacy #[id number]  atorvastatin oral 40 mg tablet: 40 mg = 1 tab, Oral, Daily, Tab, # 30 tab, 0 Refills, Maintenance  clopidogrel oral 75 mg tablet: 75 mg = 1 tab, Oral, Daily, Tab, # 30 tab, 0 Refills, Maintenance  potassium CHLORIDE oral 20 mEq ER tablet (KCl / K-Dur): 40 mEq = 2 tab, Oral, BID, Tab ER, # 120 tab, 11 Refills, Maintenance, Pharmacy: [facility]/pharmacy #[id number]  Documented Medications  Documented  ALPRAZolam oral 0.25 mg tablet: TAKE 1 TABLET BY MOUTH NIGHTLY AS NEEDED FOR SLEEP OR ANXIETY.  Breo Ellipta inhaler oral 100-25 mcg/puff powder: = 1 puff, Inhaled, Daily, INHALE 1 PUFF BY MOUTH EVERY DAY  Fish Oil oral capsule: = 1 cap, Oral, Daily, Maintenance  Metoprolol Succinate ER 25 mg oral tablet, extended release: 25 mg = 1 tab, Oral, BID, Maintenance  aspirin oral 81 mg DR tablet (Ecotrin Low Strength): 81 mg = 1 tab, Oral, Daily, Tab DR, Maintenance  calcium carbonate 1000 mg oral tablet, chewable: 1,000 mg, Chewed, Daily, Maintenance  dicyclomine oral 20 mg tablet (Bentyl): 20 mg = 1 tab, Oral, Q6H, PRN abdominal pain, Tab, Maintenance  docusate sodium  oral 100 mg capsule: 100 mg = 1 cap, Oral, Q Bedtime, PRN as needed for constipation, Maintenance  doxepin oral 25 mg capsule: 25 mg = 1 cap, Oral, Daily, PRN insomnia, Maintenance  doxepin oral 50 mg capsule: 50 mg = 1 cap, Oral, Q Bedtime, Cap, # 30 cap, Maintenance  hydrochlorothiazide-valsartan 25 mg-160 mg oral tablet: = 1 tab, Oral, Daily, Tab, Maintenance  multivitamin oral tablet: = 1 tab, Oral, Daily, Tab, Maintenance  venlafaxine 75 mg oral tablet, extended release: 75 mg = 1 tab, Oral, Daily, Tab ER, # 30 tab, Maintenance     Physical Examination   VS/Measurements   HOME VITALS   this AM  BP   132/72  pulse   67  weight   113  temp  98.4   General:  Alert and oriented, No acute distress.      Impression and Plan   Diagnosis     Chronic systolic HF (heart failure) (STG71-DR I50.22, Diagnosis, Medical).    Course:  Well controlled, NYHA IIC.    Orders     Reviewed medications.  No changes made.  Emotional support provided.  Education reinforced  regarding heart failure disease, medications,sodium and fluid restriction, daily weights, activity level, and self care guidelines provided to patient.  To follow with PCP on Sept 10  To call and make follow up appt with Dr Watson  To follow with HF clinic on Nov 5 at 9 am or sooner according to self care guidelines.  total time 15 mins.   c/o Left abdominal pain x 2 days + nausea / vomiting today , no diarrhea

## 2022-09-11 NOTE — ED PROVIDER NOTE - CLINICAL SUMMARY MEDICAL DECISION MAKING FREE TEXT BOX
66-year-old male hx of HTN, HLD, DM, prior stroke, presenting with LUQ abd pain for the past day. WIll check labs, urine, CTAP, meds, reassess.

## 2022-09-11 NOTE — ED PROVIDER NOTE - NSFOLLOWUPINSTRUCTIONS_ED_ALL_ED_FT
Kidney Stones    WHAT YOU NEED TO KNOW:    Kidney stones form in the urinary system when the water and waste in your urine are out of balance. When this happens, certain types of waste crystals separate from the urine. The crystals build up and form kidney stones. You may have more than one kidney stone.  Kidney Stones          DISCHARGE INSTRUCTIONS:    Seek care immediately if:   •You are vomiting and it is not relieved with medicine.          Call your doctor or kidney specialist if:   •You have a fever.      •You have trouble urinating.      •You see blood in your urine.      •You have severe pain.      •You have any questions or concerns about your condition or care.      Medicines: You may need any of the following:  •NSAIDs, such as ibuprofen, help decrease swelling, pain, and fever. This medicine is available with or without a doctor's order. NSAIDs can cause stomach bleeding or kidney problems in certain people. If you take blood thinner medicine, always ask your healthcare provider if NSAIDs are safe for you. Always read the medicine label and follow directions.      •Acetaminophen decreases pain and fever. It is available without a doctor's order. Ask how much to take and how often to take it. Follow directions. Read the labels of all other medicines you are using to see if they also contain acetaminophen, or ask your doctor or pharmacist. Acetaminophen can cause liver damage if not taken correctly.      •Prescription pain medicine may be given. Ask your healthcare provider how to take this medicine safely. Some prescription pain medicines contain acetaminophen. Do not take other medicines that contain acetaminophen without talking to your healthcare provider. Too much acetaminophen may cause liver damage. Prescription pain medicine may cause constipation. Ask your healthcare provider how to prevent or treat constipation.       •Medicines to balance your electrolytes may be needed.      •Take your medicine as directed. Contact your healthcare provider if you think your medicine is not helping or if you have side effects. Tell your provider if you are allergic to any medicine. Keep a list of the medicines, vitamins, and herbs you take. Include the amounts, and when and why you take them. Bring the list or the pill bottles to follow-up visits. Carry your medicine list with you in case of an emergency.      What you can do to manage kidney stones:   •Drink more liquids. Your healthcare provider may tell you to drink at least 8 to 12 (eight-ounce) cups of liquids each day. This helps flush out the kidney stones when you urinate. Water is the best liquid to drink.      •Strain your urine every time you go to the bathroom. Urinate through a strainer or a piece of thin cloth to catch the stones. Take the stones to your healthcare provider so they can be sent to the lab for tests. This will help your healthcare providers plan the best treatment for you.  Look for Stones in the Filter           •Ask if you should avoid any foods. You may need to limit oxalate. Oxalate is a chemical found in some plant foods. The most common type of kidney stone is made up of crystals that contain calcium and oxalate. Your healthcare provider or dietitian may recommend that you limit oxalate if you get this type of kidney stone often. You may need to limit how much sodium (salt) or protein you eat. Ask for information about the best foods for you.  High Oxalate Foods           •Be physically active as directed. Your stones may pass more easily if you stay active. Physical activity can also help you manage your weight. Ask about the best activities for you.   FAMILY WALKING FOR EXERCISE           After you pass the kidney stones: Your healthcare provider may order a 24-hour urine test. Results from a 24-hour urine test will help your healthcare provider plan ways to prevent more stones from forming. Your healthcare provider will give you more instructions.    Follow up with your doctor or kidney specialist as directed: Write down your questions so you remember to ask them during your visits.      Pancreatitis    WHAT YOU NEED TO KNOW:    What is pancreatitis? Pancreatitis is inflammation of your pancreas. The pancreas is an organ that makes insulin. It also makes enzymes (digestive juices) that help your body digest food. Pancreatitis may be an acute (short-term) problem that happens only once. It may become a chronic (long-term) problem that comes and goes over time.  Pancreas         What causes pancreatitis? Pancreatitis is usually caused by alcohol or gallstones. Less common causes are certain medicines, an injury to the abdomen, some procedures, and infections. High levels of triglycerides (fats) and calcium may also cause pancreatitis.    What are the signs and symptoms of pancreatitis?   •Severe burning, stabbing, or aching pain that starts in the top of your abdomen and spreads to your back      •Fever      •Nausea and vomiting      •Abdomen that is tender to the touch      •Weight loss without trying      How is pancreatitis diagnosed? Your healthcare provider will examine you and ask about your symptoms. You may need any of the following:   •Blood tests may show infection, pancreas function, or provide information about your overall health.      •An x-ray, ultrasound, or CT may show the cause of your pancreatitis and help healthcare providers plan your treatment. You may be given contrast liquid to help your pancreas show up better in the pictures. Tell the healthcare provider if you have ever had an allergic reaction to contrast liquid.       •Endoscopic retrograde cholangiopancreatography (ERCP) is a procedure used to find stones, tumors, or narrowed bile ducts. A long tube with a camera on the end is passed down your throat and into your stomach and abdomen.      How is pancreatitis treated? Treatment depends on the cause of your pancreatitis. You may need to stay in the hospital for treatment and more tests.  •Medicines: ?Prescription pain medicine may be given. Ask your healthcare provider how to take this medicine safely. Some prescription pain medicines contain acetaminophen. Do not take other medicines that contain acetaminophen without talking to your healthcare provider. Too much acetaminophen may cause liver damage. Prescription pain medicine may cause constipation. Ask your healthcare provider how to prevent or treat constipation.       ?Antibiotics may be given to treat a bacterial infection.      •Surgery may be needed to open or widen blocked bile ducts or drain fluid from your pancreas. Your gallbladder may need to be removed if gallstones are causing your pancreatitis.      How can I manage my symptoms?   •Rest when you feel it is needed. Slowly start to do more each day. Return to your usual activities as directed.      •Do not drink any alcohol. If you need help to stop drinking, contact the following organization:   ?Alcoholics Anonymous  Web Address: http://www.aa.org          •Ask your healthcare provider or dietitian about the best foods to eat. You may need to eat foods that are low in fat if you have chronic pancreatitis.      •Do not smoke. Nicotine and other chemicals in cigarettes and cigars can cause damage. Ask your healthcare provider for information if you currently smoke and need help to quit. E-cigarettes or smokeless tobacco still contain nicotine. Talk to your healthcare provider before you use these products.       When should I call my doctor?   •You have severe pain in your abdomen and you are vomiting.      •You have a fever.       •You continue to lose weight without trying.      •Your skin or the whites of your eyes turn yellow.      •You have questions or concerns about your condition or care.      CARE AGREEMENT:    You have the right to help plan your care. Learn about your health condition and how it may be treated. Discuss treatment options with your healthcare providers to decide what care you want to receive. You always have the right to refuse treatment.

## 2022-09-11 NOTE — ED PROVIDER NOTE - PATIENT PORTAL LINK FT
You can access the FollowMyHealth Patient Portal offered by [facility] by registering at the following website: [email or web address]. By joining [facility]’s FollowMyHealth portal, you will also be able to view your health information using other applications (apps) compatible with our system.

## 2022-09-11 NOTE — ED PROVIDER NOTE - PROGRESS NOTE DETAILS
Pt feels better. Educated through [facility]ner [id number] that his pancreas levels were elevated and pt would need admission. Pt abd soft NT. Pt refused admission. Agrees to have liquid diet and return if pain to abdomen develops. r/b/a explained. Pt plan to admit by Dr. Leblanc. Related that he does not want to be admitted. Pt agreed to repeat blood test for evaluation for possible passed kidney stone.

## 2022-09-11 NOTE — ED PROVIDER NOTE - OBJECTIVE STATEMENT
66-year-old male hx of HTN, HLD, DM, prior stroke, presenting with LUQ abd pain for the past day. +nausea, vomiting. No other symptoms.

## 2022-09-11 NOTE — ED ADULT NURSE NOTE - OBJECTIVE STATEMENT
Patient presents with c/o left sided abd pain onset yesterday, nausea/vomiting, denies fever/chills, diarrhea, dysuria.

## 2022-09-12 VITALS
DIASTOLIC BLOOD PRESSURE: 82 MMHG | HEART RATE: 65 BPM | SYSTOLIC BLOOD PRESSURE: 159 MMHG | OXYGEN SATURATION: 96 % | RESPIRATION RATE: 18 BRPM | TEMPERATURE: 98 F

## 2022-09-12 LAB
ALBUMIN SERPL ELPH-MCNC: 2.8 G/DL — LOW (ref 3.5–5)
ALP SERPL-CCNC: 107 U/L — SIGNIFICANT CHANGE UP (ref 40–120)
ALT FLD-CCNC: 17 U/L DA — SIGNIFICANT CHANGE UP (ref 10–60)
ANION GAP SERPL CALC-SCNC: 8 MMOL/L — SIGNIFICANT CHANGE UP (ref 5–17)
AST SERPL-CCNC: 14 U/L — SIGNIFICANT CHANGE UP (ref 10–40)
BASOPHILS # BLD AUTO: 0.02 K/UL — SIGNIFICANT CHANGE UP (ref 0–0.2)
BASOPHILS NFR BLD AUTO: 0.2 % — SIGNIFICANT CHANGE UP (ref 0–2)
BILIRUB SERPL-MCNC: 0.3 MG/DL — SIGNIFICANT CHANGE UP (ref 0.2–1.2)
BUN SERPL-MCNC: 43 MG/DL — HIGH (ref 7–18)
CALCIUM SERPL-MCNC: 7.9 MG/DL — LOW (ref 8.4–10.5)
CHLORIDE SERPL-SCNC: 106 MMOL/L — SIGNIFICANT CHANGE UP (ref 96–108)
CO2 SERPL-SCNC: 23 MMOL/L — SIGNIFICANT CHANGE UP (ref 22–31)
CREAT SERPL-MCNC: 1.83 MG/DL — HIGH (ref 0.5–1.3)
CULTURE RESULTS: SIGNIFICANT CHANGE UP
EGFR: 40 ML/MIN/1.73M2 — LOW
EOSINOPHIL # BLD AUTO: 0.13 K/UL — SIGNIFICANT CHANGE UP (ref 0–0.5)
EOSINOPHIL NFR BLD AUTO: 1.3 % — SIGNIFICANT CHANGE UP (ref 0–6)
GLUCOSE SERPL-MCNC: 222 MG/DL — HIGH (ref 70–99)
HCT VFR BLD CALC: 36.4 % — LOW (ref 39–50)
HGB BLD-MCNC: 12.2 G/DL — LOW (ref 13–17)
IMM GRANULOCYTES NFR BLD AUTO: 0.3 % — SIGNIFICANT CHANGE UP (ref 0–1.5)
LACTATE SERPL-SCNC: 1.2 MMOL/L — SIGNIFICANT CHANGE UP (ref 0.7–2)
LYMPHOCYTES # BLD AUTO: 1.83 K/UL — SIGNIFICANT CHANGE UP (ref 1–3.3)
LYMPHOCYTES # BLD AUTO: 18.7 % — SIGNIFICANT CHANGE UP (ref 13–44)
MCHC RBC-ENTMCNC: 29.7 PG — SIGNIFICANT CHANGE UP (ref 27–34)
MCHC RBC-ENTMCNC: 33.5 GM/DL — SIGNIFICANT CHANGE UP (ref 32–36)
MCV RBC AUTO: 88.6 FL — SIGNIFICANT CHANGE UP (ref 80–100)
MONOCYTES # BLD AUTO: 0.46 K/UL — SIGNIFICANT CHANGE UP (ref 0–0.9)
MONOCYTES NFR BLD AUTO: 4.7 % — SIGNIFICANT CHANGE UP (ref 2–14)
NEUTROPHILS # BLD AUTO: 7.32 K/UL — SIGNIFICANT CHANGE UP (ref 1.8–7.4)
NEUTROPHILS NFR BLD AUTO: 74.8 % — SIGNIFICANT CHANGE UP (ref 43–77)
NRBC # BLD: 0 /100 WBCS — SIGNIFICANT CHANGE UP (ref 0–0)
PLATELET # BLD AUTO: 196 K/UL — SIGNIFICANT CHANGE UP (ref 150–400)
POTASSIUM SERPL-MCNC: 4.1 MMOL/L — SIGNIFICANT CHANGE UP (ref 3.5–5.3)
POTASSIUM SERPL-SCNC: 4.1 MMOL/L — SIGNIFICANT CHANGE UP (ref 3.5–5.3)
PROT SERPL-MCNC: 6.5 G/DL — SIGNIFICANT CHANGE UP (ref 6–8.3)
RBC # BLD: 4.11 M/UL — LOW (ref 4.2–5.8)
RBC # FLD: 11.9 % — SIGNIFICANT CHANGE UP (ref 10.3–14.5)
SODIUM SERPL-SCNC: 137 MMOL/L — SIGNIFICANT CHANGE UP (ref 135–145)
SPECIMEN SOURCE: SIGNIFICANT CHANGE UP
WBC # BLD: 9.79 K/UL — SIGNIFICANT CHANGE UP (ref 3.8–10.5)
WBC # FLD AUTO: 9.79 K/UL — SIGNIFICANT CHANGE UP (ref 3.8–10.5)

## 2022-09-12 PROCEDURE — 74176 CT ABD & PELVIS W/O CONTRAST: CPT | Mod: MA

## 2022-09-12 PROCEDURE — 83605 ASSAY OF LACTIC ACID: CPT

## 2022-09-12 PROCEDURE — 99284 EMERGENCY DEPT VISIT MOD MDM: CPT | Mod: 25

## 2022-09-12 PROCEDURE — 85025 COMPLETE CBC W/AUTO DIFF WBC: CPT

## 2022-09-12 PROCEDURE — 83690 ASSAY OF LIPASE: CPT

## 2022-09-12 PROCEDURE — 87086 URINE CULTURE/COLONY COUNT: CPT

## 2022-09-12 PROCEDURE — 81001 URINALYSIS AUTO W/SCOPE: CPT

## 2022-09-12 PROCEDURE — 36415 COLL VENOUS BLD VENIPUNCTURE: CPT

## 2022-09-12 PROCEDURE — 80053 COMPREHEN METABOLIC PANEL: CPT

## 2022-09-12 RX ORDER — SODIUM CHLORIDE 9 MG/ML
1000 INJECTION INTRAMUSCULAR; INTRAVENOUS; SUBCUTANEOUS
Refills: 0 | Status: DISCONTINUED | OUTPATIENT
Start: 2022-09-12 | End: 2022-09-15

## 2023-01-12 NOTE — DISCHARGE NOTE PROVIDER - NSDCCPGOAL_GEN_ALL_CORE_FT
From: Pedro Juárez  To: Melissa Zafar  Sent: 1/12/2023 9:22 AM EST  Subject: Qsymia    Would like to request a refill please :)
Last Visit Date: 12/19/2022   Next Visit Date: Visit date not found
To get better and follow your care plan as instructed.

## 2023-02-15 NOTE — ED ADULT NURSE NOTE - BEFAST LAST WELL KNOWN
1926 [facility]  [address] [address]  Dept: [phone]     \"Destinee\"     Physical Therapy Daily Note     Insurance: Today is 3/20 visits [facility])    Total Timed Procedure Codes: 45 min, Total Time: 45 min    Referring MD: Carol Morrell MD  Surgery: Left TKA  Surgery date: 1/20/2023    Diagnosis:     ICD-10-CM    1. Acute pain of left knee  M25.562       2. Stiffness of left knee  M25.662       3. Knee swelling  M25.469       4. Weakness of left lower extremity  R29.898       5. Impaired functional mobility, balance, gait, and endurance  Z74.09       6. Difficulty in walking  R26.2             Therapy precautions:None  Co-morbidities affecting plan of care: HTN, R TKA (2013)          SUBJECTIVE     Patient reports she has 6/10 pain today. She is taking muscle relaxants instead of pain meds. She only takes Tylenol for pain. OBJECTIVE       Treatment provided today:  Therapeutic exercise (26541) x 35 min to develop ROM, strength, endurance and flexibility. Included:     SAQ 30x  LAQ 30x  Quad sets 25x  SLR 10x 2  Step stretch for flexion 10x  Seated HS stretch 1 minute    Sit to stand from 20\" seat 10x2    Standing heel raises 30x  Standing hip 3 way 20x    Manual therapy (28020) x 10 min utilizing techniques to improve joint and/or soft tissue mobility, ROM, and function as well as helping to decrease pain/spasms and swelling. (Manual stretching left quads and hip flexors to improve knee AROM flexion/STM to left IT band and quads)    A/PROM Measures:      Right Left Comment   Knee Flexion 135 112A L full extension   Knee Extension 0 0                     ASSESSMENT     Patient has no difficulty with sit to stand from 22\" seat She has moderate difficulty with sit to stand from 20\" seat. She has full extension left knee. She has improved left knee AROM flexion after manual therapy. She has antalgic gait left with rolling walking.  She is unable to negotiate stairs at her son or daughter's home. She is unable to do grocery shopping or prepare meals at this time. She has moderate edema left knee. She has moderate pain left knee. PLAN     Continue with ROM and strengthening exercises to improve function and gait. Progress exercises as tolerated. Work on gait training to progress to ambulation with cane and to reciprocate stairs. Use modalities as needed to reduce swelling and painful symptoms. PLAN OF CARE     Effective Dates: 2/10/2023 TO 4/11/2023 (60 days). Frequency/Duration: 2x/week for 60 Day(s)      GOALS     Goals:  Short term goals to be met by 3/10/2023  (4 weeks):  Pt will demonstrate good recall of HEP requiring minimal verbal cuing for proper form and technique. Pt will report pain </= 5/10 to improve performance of mobility related ADLs. Increase knee AROM of involved LE to 0-115 degrees, in order to transfer, roll in bed and don/doff shoes indep. Pt will use cane regularly to decrease fall risk. Long term goals to be met by 4/7/2023  (8 weeks):  Pt will improve TUG time to </= 10 seconds to improve walking pace in order to cross the street efficiently. Pt will independently ascend and descend 3-4 steps with reciprocal pattern demonstrating good control and balance using rails as needed for balance. Pt will report return to previous level of function with 3/10 or less c/o pain. Pt will resume walking at [facility], with , for cardiovascular fitness. Improve LEFS demonstrating no greater than 30% functional restrictions with ADLs, work and athletic activities.    Discharged from [place] [id number]    Access Code: [email or web address]

## 2024-08-27 NOTE — PATIENT PROFILE ADULT - FUNCTIONAL ASSESSMENT - DAILY ACTIVITY 2.
"Patient Education       Controlling Your Blood Pressure Through Lifestyle   The Basics   Written by the doctors and editors at [facility]   What does my lifestyle have to do with my blood pressure? -- The things you do and the foods you eat have a big effect on your blood pressure and your overall health. Following the right lifestyle can:  Lower your blood pressure or keep you from getting high blood pressure in the first place  Reduce your need for blood pressure medicines  Make medicines for high blood pressure work better, if you do take them  Lower the chances that you'll have a heart attack or stroke, or develop kidney disease  Which lifestyle choices will help lower my blood pressure? -- Here's what you can do:  Lose weight (if you are overweight)  Choose a diet rich in fruits, vegetables, and low-fat dairy products, and low in meats, sweets, and refined grains  Eat less salt (sodium)  Do something active for at least 30 minutes a day on most days of the week  Limit the amount of alcohol you drink  If you have high blood pressure, it's also very important to quit smoking (if you smoke). Quitting smoking might not bring your blood pressure down. But it will lower the chances that you'll have a heart attack or stroke, and it will help you feel better and live longer.  Start low and go slow -- The changes listed above might sound like a lot, but don't worry. You don't have to change everything all at once. The key to improving your lifestyle is to "start low and go slow." Choose 1 small, specific thing to change and try doing it for a while. If it works for you, keep doing it until it becomes a habit. If it doesn't, don't give up. Choose something else to change and see how that goes.  Let's say, for example, that you would like to improve your diet. If you're the type of person who eats cheeseburgers and French fries all the time, you can't switch to eating just salads from one day to the next. When people try to " "make changes like that, they often fail. Then they feel frustrated and tend to give up. So instead of trying to change everything about your diet in 1 day, change 1 or 2 small things about your diet and give yourself time to get used to those changes. For instance, keep the cheeseburger but give up the French fries. Or eat the same things but cut your portions in half.  As you find things that you are able to change and stick with, keep adding new changes. In time, you will see that you can actually change a lot. You just have to get used to the changes slowly.  Lose weight -- When people think about losing weight, they sometimes make it more complicated than it really is. To lose weight, you have to either eat less or move more. If you do both of those things, it's even better. But there is no single weight-loss diet or activity that's better than any other. When it comes to weight loss, the most effective plan is the one that you'll stick with.  Improve your diet -- There is no single diet that is right for everyone. But in general, a healthy diet can include:  Lots of fruits, vegetables, and whole grains  Some beans, peas, lentils, chickpeas, and similar foods  Some nuts, such as walnuts, almonds, and peanuts  Fat-free or low-fat milk and milk products  Some fish  To have a healthy diet, it's also important to limit or avoid sugar, sweets, meats, and refined grains. (Refined grains are found in white bread, white rice, most forms of pasta, and most packaged "snack" foods.)  Reduce salt -- Many people think that eating a low-sodium diet means avoiding the salt shaker and not adding salt when cooking. The truth is, not adding salt at the table or when you cook will only help a little. Almost all of the sodium you eat is already in the food you buy at the grocery store or at restaurants (figure 1).  The most important thing you can do to cut down on sodium is to eat less processed food. That means that you should " "avoid most foods that are sold in cans, boxes, jars, and bags. You should also eat in restaurants less often.  To reduce the amount of sodium you get, buy fresh or fresh-frozen fruits, vegetables, and meats. (Fresh-frozen foods have had nothing added to them before freezing.) Then you can make meals at home, from scratch, with these ingredients.  As with the other changes, don't try to cut out salt all at once. Instead, choose 1 or 2 foods that have a lot of sodium and try to replace them with low-sodium choices. When you get used to those low-sodium options, find another food or 2 to change. Then keep going, until all the foods you eat are sodium-free or low in sodium.  Become more active -- If you want to be more active, you don't have to go to the gym or get all sweaty. It is possible to increase your activity level while doing everyday things you enjoy. Walking, gardening, and dancing are just a few of the things that you might try. As with all the other changes, the key is not to do too much too fast. If you don't do any activity now, start by walking for just a few minutes every other day. Do that for a few weeks. If you stick with it, try doing it for longer. But if you find that you don't like walking, try a different activity.  Drink less alcohol -- If you are a woman, do not have more than 1 "standard drink" of alcohol a day. If you are a man, do not have more than 2. A "standard drink" is:  A can or bottle that has 12 ounces of beer  A glass that has 5 ounces of wine  A shot that has 1.5 ounces of whiskey  Where should I start? -- If you want to improve your lifestyle, start by making the changes that you think would be easiest for you. If you used to exercise and just got out of the habit, maybe it would be easy for you to start exercising again. Or if you actually like cooking meals from scratch, maybe the first thing you should focus on is eating home-cooked meals that are low in sodium.  Whatever you " "tackle first, choose specific, realistic goals, and give yourself a deadline. For example, do not decide that you are going to "exercise more." Instead, decide that you are going to walk for 10 minutes on Monday, Wednesday, and Friday, and that you are going to do this for the next 2 weeks.  When lifestyle changes are too general, people have a hard time following through.  Now go. You can do it!  All topics are updated as new evidence becomes available and our peer review process is complete.  This topic retrieved from [employer] on: Sep 21, 2021.  Topic 89739 Version 8.0  Release: 29.4.2 - C29.263  © 2021 UpToDate, Inc. and/or its affiliates. All rights reserved.  figure 1: Sources of sodium in your diet     Graphic 69511 Version 2.0    Consumer Information Use and Disclaimer   This information is not specific medical advice and does not replace information you receive from your health care provider. This is only a brief summary of general information. It does NOT include all information about conditions, illnesses, injuries, tests, procedures, treatments, therapies, discharge instructions or life-style choices that may apply to you. You must talk with your health care provider for complete information about your health and treatment options. This information should not be used to decide whether or not to accept your health care provider's advice, instructions or recommendations. Only your health care provider has the knowledge and training to provide advice that is right for you. The use of this information is governed by the [employer] End User License Agreement, available at https://www.Baike.com."[employer]/en/solutions/"[employer]/about/craig.The use of [employer] content is governed by the [employer] Terms of Use. ©2021 UpToDate, Inc. All rights reserved.  Copyright   © 2021 UpToDate, Inc. and/or its affiliates. All rights reserved.  Patient Education       Diabetes and Diet   The Basics   Written by the doctors and editors " "at [employer]   Why is diet important in diabetes? -- Diet is important because it is part of diabetes treatment. Many people need to change what they eat and how much they eat to help treat their diabetes. It is important for people to treat their diabetes so that they:  Keep their blood sugar at or near a normal level  Prevent long-term problems, such as heart or kidney problems, that can happen in people with diabetes  Changing your diet can also help treat obesity, high blood pressure, and high cholesterol. These conditions can affect people with diabetes and can lead to future problems, such as heart attacks or strokes.  Who will work with me to change my diet? -- Your doctor or nurse will work with you to make a food plan to change your diet. They might also recommend that you work with a "dietitian." A dietitian is an expert on food and eating.  Do I need to eat at the same times every day? -- When and how often you should eat depends, in part, on the diabetes medicines you take. For example:  People who take about the same amount of insulin at the same time each day (called a "fixed regimen") should eat meals at the same times. This is also true for people who take pills that increase insulin levels, such as sulfonylureas. Eating meals at the same time every day helps prevent low blood sugar.  People who adjust the dose and timing of their insulin each day (called a "flexible regimen") do not always have to eat meals at the same time. That's because they can time their insulin dose for before they plan to eat, and also adjust the dose for how much they plan to eat.  People who take medicines that don't usually cause low blood sugar, such as metformin, don't have to eat meals at the same time every day.  What do I need to think about when planning what to eat? -- Our bodies break down the food we eat into small pieces called carbohydrates, proteins, and fats.  When planning what to eat, people with diabetes " "need to think about:  Carbohydrates (or "carbs") - Carbohydrates, which are sugars that our bodies use for energy, can raise a person's blood sugar level. Your doctor, nurse, or dietitian will tell you how many carbohydrates you should eat at each meal or snack. Foods that have carbohydrates include:  Bread, pasta, and rice  Vegetables and fruits  Dairy foods  Foods and drinks with added sugar  It is best to get your carbohydrates from fruits, vegetables, whole grains, and low-fat milk. It is best to avoid drinks with added sugar, like soda, juices, and sports drinks.   Protein - Your doctor, nurse, or dietitian will tell you how much protein you should eat each day. It is best to eat lean meats, fish, eggs, beans, peas, soy products, nuts, and seeds.  Fats - The type of fat you eat is more important than the amount of fat. "Saturated" and "trans" fats can increase your risk for heart problems, like a heart attack.  Foods that have saturated fats include meat, butter, cheese, and ice cream.  Foods that have trans fats include processed food with "partially hydrogenated oils" on the ingredient list. This may include fried foods, store bought cookies, muffins, pies, and cakes.  "Monounsaturated" and "polyunsaturated" fats are better for you. Foods with these types of fat include fish, avocado, olive oil, and nuts.  Calories - People need to eat a certain amount of calories each day to keep their weight the same. People who are overweight and want to lose weight need to eat fewer calories each day.  Fiber - Eating foods with a lot of fiber can help control a person's blood sugar level. Foods that have a lot of fiber include apples, green beans, peas, beans, lentils, nuts, oatmeal, and whole grains.  Salt - People who have high blood pressure should not eat foods that contain a lot of salt (also called sodium). People with high blood pressure should also eat healthy foods, such as fruits, vegetables, and low-fat dairy " foods.  Alcohol - Having more than 1 drink (for women) or 2 drinks (for men) a day can raise blood sugar levels. Also, drinks that have fruit juice or soda in them can raise blood sugar levels.  What can I do if I need to lose weight? -- If you need to lose weight, you can:  Exercise - Try to get at least 30 minutes of physical activity a day, most days of the week. Even gentle exercise, like walking, is good for your health. Some people with diabetes need to change their medicine dose before they exercise. They might also need to check their blood sugar levels before and after exercising.  Eat fewer calories - Your doctor, nurse, or dietitian can tell you how many calories you should eat each day in order to lose weight.  If you are worried about your weight, size, or shape, talk with your doctor, nurse, or dietitian. They can help you make changes to improve your health.  Can I eat the same foods as my family? -- Yes. You do not need to eat special foods if you have diabetes. You and your family can eat the same foods. Changing your diet is mostly about eating healthy foods and not eating too much.  What are the other parts of diabetes treatment? -- Besides changing your diet, the other parts of diabetes treatment are:  Exercise  Medicines  Some people with diabetes need to learn how to match their diet and exercise with their medicine dose. For example, people who use insulin might need to choose the dose of insulin they give themselves. To choose their dose, they need to think about:  What they plan to eat at the next meal  How much exercise they plan to do  What their blood sugar level is  If the diet and exercise do not match the medicine dose, a person's blood sugar level can get too low or too high. Blood sugar levels that are too low or too high can cause problems.  All topics are updated as new evidence becomes available and our peer review process is complete.  This topic retrieved from [employer] on: Sep  21, 2021.  Topic 77512 Version 7.0  Release: 29.4.2 - C29.263  © 2021 UpToDate, Inc. and/or its affiliates. All rights reserved.  Consumer Information Use and Disclaimer   This information is not specific medical advice and does not replace information you receive from your health care provider. This is only a brief summary of general information. It does NOT include all information about conditions, illnesses, injuries, tests, procedures, treatments, therapies, discharge instructions or life-style choices that may apply to you. You must talk with your health care provider for complete information about your health and treatment options. This information should not be used to decide whether or not to accept your health care provider's advice, instructions or recommendations. Only your health care provider has the knowledge and training to provide advice that is right for you. The use of this information is governed by the [employer] End User License Agreement, available at https://www.Librato.[employer]/en/solutions/[employer]Map/about/craig.The use of [employer] content is governed by the [employer] Terms of Use. ©2021 UpToDate, Inc. All rights reserved.  Copyright   © 2021 UpToDate, Inc. and/or its affiliates. All rights reserved.     4 = No assist / stand by assistance

## 2025-03-03 ENCOUNTER — APPOINTMENT (OUTPATIENT)
Dept: VASCULAR SURGERY | Facility: CLINIC | Age: 70
End: 2025-03-03
Payer: MEDICARE

## 2025-03-03 DIAGNOSIS — I65.23 OCCLUSION AND STENOSIS OF BILATERAL CAROTID ARTERIES: ICD-10-CM

## 2025-03-03 PROCEDURE — 99205 OFFICE O/P NEW HI 60 MIN: CPT | Mod: 25

## 2025-03-03 PROCEDURE — 93880 EXTRACRANIAL BILAT STUDY: CPT

## 2025-03-06 NOTE — ED ADULT NURSE NOTE - TEMPLATE LIST FOR HEAD TO TOE ASSESSMENT
Sherrie Grimes is a 33 year old female presenting to the walk-in clinic today alone for coughing worse since Monday.    She has been having some coughing issues since Decemberish.     She has had 2 of her kids sick with the flu,etc also.   Over a month ago.       Treatment tried prior to visit: otc products.         Swabs/Specimens collected during rooming process:  None    Work, School or  note needed: Yes    New vs Established: Established    Patient would like communication of their results via:      Cell Phone:   Telephone Information:   Mobile [phone]     Okay to leave a message containing results? Yes        Bites

## 2025-03-07 ENCOUNTER — OUTPATIENT (OUTPATIENT)
Dept: OUTPATIENT SERVICES | Facility: HOSPITAL | Age: 70
LOS: 1 days | End: 2025-03-07

## 2025-03-07 ENCOUNTER — RESULT REVIEW (OUTPATIENT)
Age: 70
End: 2025-03-07

## 2025-03-07 DIAGNOSIS — R06.02 SHORTNESS OF BREATH: ICD-10-CM

## 2025-03-07 PROCEDURE — A9502: CPT

## 2025-03-07 PROCEDURE — 93017 CV STRESS TEST TRACING ONLY: CPT

## 2025-03-07 PROCEDURE — 78452 HT MUSCLE IMAGE SPECT MULT: CPT | Mod: MC

## 2025-03-19 ENCOUNTER — OUTPATIENT (OUTPATIENT)
Dept: OUTPATIENT SERVICES | Facility: HOSPITAL | Age: 70
LOS: 1 days | End: 2025-03-19
Payer: MEDICARE

## 2025-03-19 VITALS
HEIGHT: 64.17 IN | DIASTOLIC BLOOD PRESSURE: 64 MMHG | OXYGEN SATURATION: 98 % | RESPIRATION RATE: 18 BRPM | SYSTOLIC BLOOD PRESSURE: 137 MMHG | TEMPERATURE: 97 F | WEIGHT: 167.99 LBS | HEART RATE: 62 BPM

## 2025-03-19 DIAGNOSIS — E11.9 TYPE 2 DIABETES MELLITUS WITHOUT COMPLICATIONS: ICD-10-CM

## 2025-03-19 DIAGNOSIS — I65.23 OCCLUSION AND STENOSIS OF BILATERAL CAROTID ARTERIES: ICD-10-CM

## 2025-03-19 DIAGNOSIS — Z29.9 ENCOUNTER FOR PROPHYLACTIC MEASURES, UNSPECIFIED: ICD-10-CM

## 2025-03-19 DIAGNOSIS — Z98.890 OTHER SPECIFIED POSTPROCEDURAL STATES: Chronic | ICD-10-CM

## 2025-03-19 LAB
ANION GAP SERPL CALC-SCNC: 18 MMOL/L — HIGH (ref 5–17)
BLD GP AB SCN SERPL QL: NEGATIVE — SIGNIFICANT CHANGE UP
BUN SERPL-MCNC: 80 MG/DL — HIGH (ref 7–23)
CALCIUM SERPL-MCNC: 8.8 MG/DL — SIGNIFICANT CHANGE UP (ref 8.4–10.5)
CHLORIDE SERPL-SCNC: 100 MMOL/L — SIGNIFICANT CHANGE UP (ref 96–108)
CO2 SERPL-SCNC: 18 MMOL/L — LOW (ref 22–31)
CREAT SERPL-MCNC: 4.98 MG/DL — HIGH (ref 0.5–1.3)
EGFR: 12 ML/MIN/1.73M2 — LOW
EGFR: 12 ML/MIN/1.73M2 — LOW
ESTIMATED AVERAGE GLUCOSE: 183 MG/DL — HIGH (ref 68–114)
GLUCOSE SERPL-MCNC: 320 MG/DL — HIGH (ref 70–99)
HCT VFR BLD CALC: 36.2 % — LOW (ref 39–50)
HGB BLD-MCNC: 11.9 G/DL — LOW (ref 13–17)
MCHC RBC-ENTMCNC: 29 PG — SIGNIFICANT CHANGE UP (ref 27–34)
MCHC RBC-ENTMCNC: 32.9 G/DL — SIGNIFICANT CHANGE UP (ref 32–36)
MCV RBC AUTO: 88.1 FL — SIGNIFICANT CHANGE UP (ref 80–100)
NRBC BLD AUTO-RTO: 0 /100 WBCS — SIGNIFICANT CHANGE UP (ref 0–0)
PLATELET # BLD AUTO: 256 K/UL — SIGNIFICANT CHANGE UP (ref 150–400)
POTASSIUM SERPL-MCNC: 4.3 MMOL/L — SIGNIFICANT CHANGE UP (ref 3.5–5.3)
POTASSIUM SERPL-SCNC: 4.3 MMOL/L — SIGNIFICANT CHANGE UP (ref 3.5–5.3)
RBC # BLD: 4.11 M/UL — LOW (ref 4.2–5.8)
RBC # FLD: 11.6 % — SIGNIFICANT CHANGE UP (ref 10.3–14.5)
RH IG SCN BLD-IMP: POSITIVE — SIGNIFICANT CHANGE UP
SODIUM SERPL-SCNC: 136 MMOL/L — SIGNIFICANT CHANGE UP (ref 135–145)
WBC # BLD: 10.24 K/UL — SIGNIFICANT CHANGE UP (ref 3.8–10.5)
WBC # FLD AUTO: 10.24 K/UL — SIGNIFICANT CHANGE UP (ref 3.8–10.5)

## 2025-03-19 PROCEDURE — 80048 BASIC METABOLIC PNL TOTAL CA: CPT

## 2025-03-19 PROCEDURE — 83036 HEMOGLOBIN GLYCOSYLATED A1C: CPT

## 2025-03-19 PROCEDURE — 86901 BLOOD TYPING SEROLOGIC RH(D): CPT

## 2025-03-19 PROCEDURE — 86850 RBC ANTIBODY SCREEN: CPT

## 2025-03-19 PROCEDURE — G0463: CPT

## 2025-03-19 PROCEDURE — 86900 BLOOD TYPING SEROLOGIC ABO: CPT

## 2025-03-19 PROCEDURE — 85027 COMPLETE CBC AUTOMATED: CPT

## 2025-03-19 RX ORDER — LIDOCAINE HCL/PF 10 MG/ML
0.2 VIAL (ML) INJECTION ONCE
Refills: 0 | Status: DISCONTINUED | OUTPATIENT
Start: 2025-04-03 | End: 2025-04-03

## 2025-03-19 NOTE — H&P PST ADULT - NSANTHOSAYNRD_GEN_A_CORE
No. POLA screening performed.  STOP BANG Legend: 0-2 = LOW Risk; 3-4 = INTERMEDIATE Risk; 5-8 = HIGH Risk

## 2025-03-19 NOTE — H&P PST ADULT - NSICDXPASTMEDICALHX_GEN_ALL_CORE_FT
PAST MEDICAL HISTORY:  CAD (coronary artery disease)     CVA (cerebrovascular accident)     Diabetes mellitus, type II, insulin dependent     HTN (hypertension)     Hypercholesterolemia     Hypothyroidism

## 2025-03-19 NOTE — H&P PST ADULT - ASSESSMENT
DASI score: 5.9 METS  DASI activity: walking, light housework  Loose teeth or denture: Denies  MP: Class 3    CAPRINI SCORE    AGE RELATED RISK FACTORS                                                             [ ] Age 41-60 years                                            (1 Point)  [X ] Age: 61-74 years                                           (2 Points)                 [ ] Age= 75 years                                                (3 Points)             DISEASE RELATED RISK FACTORS                                                       [ ] Edema in the lower extremities                 (1 Point)                     [ ] Varicose veins                                               (1 Point)                                 [ X] BMI > 25 Kg/m2                                            (1 Point)                                  [ ] Serious infection (ie PNA)                            (1 Point)                     [ ] Lung disease ( COPD, Emphysema)            (1 Point)                                                                          [ ] Acute myocardial infarction                         (1 Point)                  [ ] Congestive heart failure (in the previous month)  (1 Point)         [ ] Inflammatory bowel disease                            (1 Point)                  [ ] Central venous access, PICC or Port               (2 points)       (within the last month)                                                                [ ] Stroke (in the previous month)                        (5 Points)    [ ] Previous or present malignancy                       (2 points)                                                                                                                                                         HEMATOLOGY RELATED FACTORS                                                         [ ] Prior episodes of VTE                                     (3 Points)                     [ ] Positive family history for VTE                      (3 Points)                  [ ] Prothrombin 92973 A                                     (3 Points)                     [ ] Factor V Leiden                                                (3 Points)                        [ ] Lupus anticoagulants                                      (3 Points)                                                           [ ] Anticardiolipin antibodies                              (3 Points)                                                       [ ] High homocysteine in the blood                   (3 Points)                                             [ ] Other congenital or acquired thrombophilia      (3 Points)                                                [ ] Heparin induced thrombocytopenia                  (3 Points)                                        MOBILITY RELATED FACTORS  [ ] Bed rest                                                         (1 Point)  [ ] Plaster cast                                                    (2 points)  [ ] Bed bound for more than 72 hours           (2 Points)    GENDER SPECIFIC FACTORS  [ ] Pregnancy or had a baby within the last month   (1 Point)  [ ] Post-partum < 6 weeks                                   (1 Point)  [ ] Hormonal therapy  or oral contraception   (1 Point)  [ ] History of pregnancy complications              (1 point)  [ ] Unexplained or recurrent              (1 Point)    OTHER RISK FACTORS                                           (1 Point)  [ ] BMI >40, smoking, diabetes requiring insulin, chemotherapy  blood transfusions and length of surgery over 2 hours    SURGERY RELATED RISK FACTORS  [ ]  Section within the last month     (1 Point)  [ ] Minor surgery                                                  (1 Point)  [ ] Arthroscopic surgery                                       (2 Points)  [X ] Planned major surgery lasting more            (2 Points)      than 45 minutes     [ ] Elective hip or knee joint replacement       (5 points)       surgery                                                TRAUMA RELATED RISK FACTORS  [ ] Fracture of the hip, pelvis, or leg                       (5 Points)  [ ] Spinal cord injury resulting in paralysis             (5 points)       (in the previous month)    [ ] Paralysis  (less than 1 month)                             (5 Points)  [ ] Multiple Trauma within 1 month                        (5 Points)    Total Score [   5     ]    Caprini Score 0-2: Low Risk, NO VTE prophylaxis required for most patients, encourage ambulation  Caprini Score 3-6: Moderate Risk , pharmacologic VTE prophylaxis is indicated for most patients (in the absence of contraindications)  Caprini Score Greater than or =7: High risk, pharmocologic VTE prophylaxis indicated for most patients (in the absence of contraindications)

## 2025-03-19 NOTE — H&P PST ADULT - PROBLEM SELECTOR PLAN 2
A1C collected  FS on DOS    Patient advised to hold Novolog dinner/evening dose on 04/02/2025 (night prior to surgery)  ""     ""   advised to decrease basaglar 20% to 16 units on the evening prior to surgery 04/02/2025

## 2025-03-19 NOTE — H&P PST ADULT - HISTORY OF PRESENT ILLNESS
68 y/o M with PMHx significant for CAD, HTN, HLD, T2DM, Insulin Dependent, CVA ([place], 2022) reports experiencing symptoms of fatigue and intermittent dizziness. He was found to have significant Right Carotid Artery Stenosis on Doppler. Patient is scheduled for Right Carotid Endarterectomy with EEG with Dr. Vargas on 04/03/2025. 68 y/o M with PMHx significant for CAD, HTN, HLD, Insulin Dependant DM,   CVA (Left [place], 2022) reports experiencing symptoms of fatigue and intermittent dizziness. He was found to have significant Right Carotid Artery Stenosis on Doppler. Patient is scheduled for Right Carotid Endarterectomy with EEG with Dr. Vargas on 04/03/2025.
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
## 2025-03-19 NOTE — H&P PST ADULT - PROBLEM SELECTOR PLAN 1
Scheduled for Right Carotid Endarterectomy   Pre-procedure labs collected. Surgical soap given to patient. PST instructions provided. Patient verbalized understanding of instructions.    Patient previously instructed to hold Plavix 48 hours prior to procedure. Last dose 03/31/2025  Continue Aspirin 81mg

## 2025-03-27 ENCOUNTER — APPOINTMENT (OUTPATIENT)
Dept: VASCULAR SURGERY | Facility: HOSPITAL | Age: 70
End: 2025-03-27

## 2025-04-03 ENCOUNTER — APPOINTMENT (OUTPATIENT)
Dept: VASCULAR SURGERY | Facility: HOSPITAL | Age: 70
End: 2025-04-03
Payer: MEDICARE

## 2025-04-03 ENCOUNTER — INPATIENT (INPATIENT)
Facility: HOSPITAL | Age: 70
LOS: 0 days | Discharge: HOME | DRG: 38 | End: 2025-04-04
Attending: SURGERY | Admitting: SURGERY
Payer: MEDICARE

## 2025-04-03 ENCOUNTER — RESULT REVIEW (OUTPATIENT)
Age: 70
End: 2025-04-03

## 2025-04-03 VITALS
OXYGEN SATURATION: 97 % | TEMPERATURE: 98 F | HEART RATE: 55 BPM | SYSTOLIC BLOOD PRESSURE: 174 MMHG | RESPIRATION RATE: 16 BRPM | DIASTOLIC BLOOD PRESSURE: 22 MMHG

## 2025-04-03 DIAGNOSIS — I65.23 OCCLUSION AND STENOSIS OF BILATERAL CAROTID ARTERIES: ICD-10-CM

## 2025-04-03 DIAGNOSIS — Z98.890 OTHER SPECIFIED POSTPROCEDURAL STATES: Chronic | ICD-10-CM

## 2025-04-03 LAB
GAS PNL BLDA: SIGNIFICANT CHANGE UP
GLUCOSE BLDC GLUCOMTR-MCNC: 205 MG/DL — HIGH (ref 70–99)
GLUCOSE BLDC GLUCOMTR-MCNC: 243 MG/DL — HIGH (ref 70–99)
GLUCOSE BLDC GLUCOMTR-MCNC: 256 MG/DL — HIGH (ref 70–99)
GLUCOSE BLDC GLUCOMTR-MCNC: 275 MG/DL — HIGH (ref 70–99)
RH IG SCN BLD-IMP: POSITIVE — SIGNIFICANT CHANGE UP

## 2025-04-03 PROCEDURE — 88300 SURGICAL PATH GROSS: CPT | Mod: 26

## 2025-04-03 PROCEDURE — 35301 RECHANNELING OF ARTERY: CPT | Mod: RT

## 2025-04-03 PROCEDURE — 43310 REPAIR OF ESOPHAGUS: CPT

## 2025-04-03 DEVICE — CLIP APPLIER COVIDIEN SURGICLIP 11.5" MEDIUM: Type: IMPLANTABLE DEVICE | Site: RIGHT | Status: FUNCTIONAL

## 2025-04-03 DEVICE — AGENT HEMOSTATIC HEMOBLAST 1.65G 10CM: Type: IMPLANTABLE DEVICE | Site: RIGHT | Status: FUNCTIONAL

## 2025-04-03 DEVICE — CLIP APPLIER COVIDIEN SURGICLIP III 9" SM: Type: IMPLANTABLE DEVICE | Site: RIGHT | Status: FUNCTIONAL

## 2025-04-03 DEVICE — KIT A-LINE 1LUM 20G X 12CM SAFE KIT: Type: IMPLANTABLE DEVICE | Site: RIGHT | Status: FUNCTIONAL

## 2025-04-03 DEVICE — PATCH VASC PERIPHERAL 0.8X8CM: Type: IMPLANTABLE DEVICE | Site: RIGHT | Status: FUNCTIONAL

## 2025-04-03 RX ORDER — INSULIN ASPART 100 [IU]/ML
8 INJECTION, SOLUTION INTRAVENOUS; SUBCUTANEOUS
Refills: 0 | DISCHARGE

## 2025-04-03 RX ORDER — FENTANYL CITRATE-0.9 % NACL/PF 100MCG/2ML
25 SYRINGE (ML) INTRAVENOUS
Refills: 0 | Status: DISCONTINUED | OUTPATIENT
Start: 2025-04-03 | End: 2025-04-04

## 2025-04-03 RX ORDER — METOPROLOL SUCCINATE 50 MG/1
1 TABLET, EXTENDED RELEASE ORAL
Refills: 0 | DISCHARGE

## 2025-04-03 RX ORDER — LEVOTHYROXINE SODIUM 300 MCG
1 TABLET ORAL
Refills: 0 | DISCHARGE

## 2025-04-03 RX ORDER — ACETAMINOPHEN 500 MG/5ML
975 LIQUID (ML) ORAL EVERY 6 HOURS
Refills: 0 | Status: DISCONTINUED | OUTPATIENT
Start: 2025-04-03 | End: 2025-04-04

## 2025-04-03 RX ORDER — DEXTROSE 50 % IN WATER 50 %
15 SYRINGE (ML) INTRAVENOUS ONCE
Refills: 0 | Status: DISCONTINUED | OUTPATIENT
Start: 2025-04-03 | End: 2025-04-04

## 2025-04-03 RX ORDER — NIFEDIPINE 30 MG
90 TABLET, EXTENDED RELEASE 24 HR ORAL DAILY
Refills: 0 | Status: DISCONTINUED | OUTPATIENT
Start: 2025-04-03 | End: 2025-04-04

## 2025-04-03 RX ORDER — DEXTROSE 50 % IN WATER 50 %
25 SYRINGE (ML) INTRAVENOUS ONCE
Refills: 0 | Status: DISCONTINUED | OUTPATIENT
Start: 2025-04-03 | End: 2025-04-04

## 2025-04-03 RX ORDER — INSULIN GLARGINE-YFGN 100 [IU]/ML
10 INJECTION, SOLUTION SUBCUTANEOUS AT BEDTIME
Refills: 0 | Status: DISCONTINUED | OUTPATIENT
Start: 2025-04-03 | End: 2025-04-04

## 2025-04-03 RX ORDER — LEVOTHYROXINE SODIUM 300 MCG
50 TABLET ORAL DAILY
Refills: 0 | Status: DISCONTINUED | OUTPATIENT
Start: 2025-04-03 | End: 2025-04-04

## 2025-04-03 RX ORDER — LOSARTAN POTASSIUM 100 MG/1
1 TABLET, FILM COATED ORAL
Refills: 0 | DISCHARGE

## 2025-04-03 RX ORDER — GLUCAGON 3 MG/1
1 POWDER NASAL ONCE
Refills: 0 | Status: DISCONTINUED | OUTPATIENT
Start: 2025-04-03 | End: 2025-04-04

## 2025-04-03 RX ORDER — DEXTROSE 50 % IN WATER 50 %
12.5 SYRINGE (ML) INTRAVENOUS ONCE
Refills: 0 | Status: DISCONTINUED | OUTPATIENT
Start: 2025-04-03 | End: 2025-04-04

## 2025-04-03 RX ORDER — HEPARIN SODIUM 1000 [USP'U]/ML
5000 INJECTION INTRAVENOUS; SUBCUTANEOUS EVERY 8 HOURS
Refills: 0 | Status: DISCONTINUED | OUTPATIENT
Start: 2025-04-03 | End: 2025-04-04

## 2025-04-03 RX ORDER — SODIUM CHLORIDE 9 G/1000ML
1000 INJECTION, SOLUTION INTRAVENOUS
Refills: 0 | Status: DISCONTINUED | OUTPATIENT
Start: 2025-04-03 | End: 2025-04-04

## 2025-04-03 RX ORDER — FENTANYL CITRATE-0.9 % NACL/PF 100MCG/2ML
50 SYRINGE (ML) INTRAVENOUS
Refills: 0 | Status: DISCONTINUED | OUTPATIENT
Start: 2025-04-03 | End: 2025-04-04

## 2025-04-03 RX ORDER — CEFAZOLIN SODIUM IN 0.9 % NACL 3 G/100 ML
2000 INTRAVENOUS SOLUTION, PIGGYBACK (ML) INTRAVENOUS ONCE
Refills: 0 | Status: COMPLETED | OUTPATIENT
Start: 2025-04-03 | End: 2025-04-03

## 2025-04-03 RX ORDER — ASPIRIN 325 MG
81 TABLET ORAL DAILY
Refills: 0 | Status: DISCONTINUED | OUTPATIENT
Start: 2025-04-04 | End: 2025-04-04

## 2025-04-03 RX ORDER — ACETAMINOPHEN 500 MG/5ML
1000 LIQUID (ML) ORAL ONCE
Refills: 0 | Status: COMPLETED | OUTPATIENT
Start: 2025-04-03 | End: 2025-04-03

## 2025-04-03 RX ORDER — OXYCODONE HYDROCHLORIDE 30 MG/1
5 TABLET ORAL ONCE
Refills: 0 | Status: DISCONTINUED | OUTPATIENT
Start: 2025-04-03 | End: 2025-04-04

## 2025-04-03 RX ORDER — INSULIN GLARGINE-YFGN 100 [IU]/ML
20 INJECTION, SOLUTION SUBCUTANEOUS
Refills: 0 | DISCHARGE

## 2025-04-03 RX ORDER — LABETALOL HYDROCHLORIDE 200 MG/1
10 TABLET, FILM COATED ORAL ONCE
Refills: 0 | Status: COMPLETED | OUTPATIENT
Start: 2025-04-03 | End: 2025-04-03

## 2025-04-03 RX ORDER — NIFEDIPINE 30 MG
1 TABLET, EXTENDED RELEASE 24 HR ORAL
Refills: 0 | DISCHARGE

## 2025-04-03 RX ORDER — CLOPIDOGREL BISULFATE 75 MG/1
75 TABLET, FILM COATED ORAL DAILY
Refills: 0 | Status: DISCONTINUED | OUTPATIENT
Start: 2025-04-04 | End: 2025-04-04

## 2025-04-03 RX ORDER — ONDANSETRON HCL/PF 4 MG/2 ML
4 VIAL (ML) INJECTION ONCE
Refills: 0 | Status: DISCONTINUED | OUTPATIENT
Start: 2025-04-03 | End: 2025-04-04

## 2025-04-03 RX ORDER — NICARDIPINE HCL 30 MG
5 CAPSULE ORAL
Qty: 40 | Refills: 0 | Status: DISCONTINUED | OUTPATIENT
Start: 2025-04-03 | End: 2025-04-04

## 2025-04-03 RX ORDER — METOPROLOL SUCCINATE 50 MG/1
50 TABLET, EXTENDED RELEASE ORAL
Refills: 0 | Status: DISCONTINUED | OUTPATIENT
Start: 2025-04-03 | End: 2025-04-04

## 2025-04-03 RX ORDER — FUROSEMIDE 10 MG/ML
1 INJECTION INTRAMUSCULAR; INTRAVENOUS
Refills: 0 | DISCHARGE

## 2025-04-03 RX ORDER — CLOPIDOGREL BISULFATE 75 MG/1
1 TABLET, FILM COATED ORAL
Refills: 0 | DISCHARGE

## 2025-04-03 RX ORDER — INSULIN LISPRO 100 U/ML
INJECTION, SOLUTION INTRAVENOUS; SUBCUTANEOUS EVERY 6 HOURS
Refills: 0 | Status: DISCONTINUED | OUTPATIENT
Start: 2025-04-03 | End: 2025-04-04

## 2025-04-03 RX ADMIN — INSULIN GLARGINE-YFGN 10 UNIT(S): 100 INJECTION, SOLUTION SUBCUTANEOUS at 22:10

## 2025-04-03 RX ADMIN — Medication 1000 MILLIGRAM(S): at 15:11

## 2025-04-03 RX ADMIN — HEPARIN SODIUM 5000 UNIT(S): 1000 INJECTION INTRAVENOUS; SUBCUTANEOUS at 20:08

## 2025-04-03 RX ADMIN — Medication 25 MG/HR: at 14:40

## 2025-04-03 RX ADMIN — Medication 90 MILLIGRAM(S): at 21:23

## 2025-04-03 RX ADMIN — Medication 975 MILLIGRAM(S): at 21:00

## 2025-04-03 RX ADMIN — Medication 975 MILLIGRAM(S): at 20:08

## 2025-04-03 RX ADMIN — Medication 400 MILLIGRAM(S): at 14:12

## 2025-04-03 RX ADMIN — LABETALOL HYDROCHLORIDE 10 MILLIGRAM(S): 200 TABLET, FILM COATED ORAL at 13:44

## 2025-04-03 RX ADMIN — Medication 50 MILLIGRAM(S): at 22:10

## 2025-04-03 RX ADMIN — Medication 50 MILLIGRAM(S): at 14:12

## 2025-04-03 RX ADMIN — METOPROLOL SUCCINATE 50 MILLIGRAM(S): 50 TABLET, EXTENDED RELEASE ORAL at 17:17

## 2025-04-03 RX ADMIN — INSULIN LISPRO 3: 100 INJECTION, SOLUTION INTRAVENOUS; SUBCUTANEOUS at 14:44

## 2025-04-03 RX ADMIN — INSULIN LISPRO 2: 100 INJECTION, SOLUTION INTRAVENOUS; SUBCUTANEOUS at 23:51

## 2025-04-03 NOTE — PRE-OP CHECKLIST - AS BP NONINV METHOD
Juvencio called in stating [facility] has informed him that his losartan-hydrochlorothiazide (HYZAAR) 100-12.5 MG per tablet prescription is on back order. Juvencio stated the pharmacy informed him they have enough to fill half and if that is ok then we need to write a new prescription. If needed [facility] pharmacy can be reached at [phone].     If we need to reach out to Juvencio he can be reached at [phone]   
rx sent.  
electronic

## 2025-04-03 NOTE — PRE-ANESTHESIA EVALUATION ADULT - NSANTHPMHFT_GEN_ALL_CORE
67M hx of CAD, CVA 2022, DM on insulin, HTN, HLD, with complaints of dizziness, fatigue here for R CEA. 2025 Stress 72% EF. 67M hx of CAD, CVA 2022, DM on insulin, HTN, HLD, CKD with complaints of dizziness, fatigue here for R CEA. 2025 Stress 72% EF.

## 2025-04-03 NOTE — PRE-OP CHECKLIST - SITE MARKED BY ANESTHESIOLOGIST
n/a Expected Date Of Service: 09/03/2024 Bill For Surgical Tray: no Billing Type: Third-Party Bill Performing Laboratory: [id number]

## 2025-04-03 NOTE — CONSULT NOTE ADULT - SUBJECTIVE AND OBJECTIVE BOX
[facility]  - Ans Serv [phone], Office [phone]  Dr Abdul/Dr Fierro /Dr Derrek bobo /Dr MARCO Alejandro/Dr Bradly Talamantes/Dr Melo Guillaume /Dr HAILY Singer  _______________________________________________________________________________________________  The patient seen and examined today.  HPI:  Patient is a 69 year old male with PMH of CKD stage 5, CAD, HTN, HLD, DM, and history of CVA who presents to the hospital for scheduled right carotid endarterectomy today. The nephrology team was consulted for management of CKD. The patient was seen and examined earlier today accompanied by family at bedside. The patient follows with Dr. Abdul as outpatient and was last seen in the office on 3/31/25. The patient has known CKD. Denied any acute complaints during the encounter. No chest pain, shortness of breath, nausea, vomiting, or abdominal pain.       PAST MEDICAL & SURGICAL HISTORY:  HTN (hypertension)      Hypercholesterolemia      CAD (coronary artery disease)      Diabetes mellitus, type II, insulin dependent      CVA (cerebrovascular accident)      Hypothyroidism      H/O colonoscopy        Allergies :- No Known Allergies    Home Medications Reviewed  Hospital Medications:   MEDICATIONS  (STANDING):  chlorhexidine 2% Cloths 1 Application(s) Topical once  lidocaine 1% Injectable 0.2 milliLiter(s) Local Injection once  sodium chloride 0.9% lock flush 3 milliLiter(s) IV Push every 8 hours    SOCIAL HISTORY:  Denies ETOh,Smoking,   FAMILY HISTORY:      REVIEW OF SYSTEMS:  All other review of systems is negative unless indicated above.    VITALS:  T(F): 97.5 (04-03-25 @ 09:28), Max: 97.5 (04-03-25 @ 09:28)  HR: 55 (04-03-25 @ 09:28)  BP: 174/22 (04-03-25 @ 09:28)  RR: 16 (04-03-25 @ 09:28)  SpO2: 97% (04-03-25 @ 09:28)  Wt(kg): --        PHYSICAL EXAM:  Constitutional: NAD  HEENT: anicteric sclera, oropharynx clear, MMM  Respiratory: Bilateral equal breath sounds , no wheezes, no crackles  Cardiovascular: S1, S2, Regular  Gastrointestinal: Bowel Sound present, soft, NT/ND  Extremities: No peripheral edema  Neurological: Alert and oriented x 3  Psychiatric: Normal mood, normal affect    Data:        Creatinine Trend:     Urine Studies:

## 2025-04-03 NOTE — CHART NOTE - NSCHARTNOTEFT_GEN_A_CORE
SURGERY POST OP CHECK    STATUS POST PROCEDURE: R CEA     SUBJECTIVE: Pt seen and examined at bedside. Patient reports appropriate surgical incisional pain; pain is controlled. Denies fevers/chills, chest pain, dyspnea, nausea, vomiting   Pt has not passed gas or had bowel movement yet. Pt is voiding well. Pt is tolerating diet. Pt is ambulating well    --------------------------------------------------------------------------------------------------------------------------------------------------------------------------------------------------------------  OBJECTIVE:  Vital Signs Last 24 Hrs  T(C): 36.5 (03 Apr 2025 15:00), Max: 36.5 (03 Apr 2025 15:00)  T(F): 97.7 (03 Apr 2025 15:00), Max: 97.7 (03 Apr 2025 15:00)  HR: 79 (03 Apr 2025 16:45) (55 - 84)  BP: 170/77 (03 Apr 2025 14:15) (155/71 - 174/22)  BP(mean): 110 (03 Apr 2025 14:15) (108 - 110)  RR: 16 (03 Apr 2025 16:45) (16 - 16)  SpO2: 96% (03 Apr 2025 16:45) (93% - 97%)    Parameters below as of 03 Apr 2025 15:00  Patient On (Oxygen Delivery Method): room air      I&O's Summary    03 Apr 2025 07:01  -  03 Apr 2025 17:24  --------------------------------------------------------  IN: 275 mL / OUT: 500 mL / NET: -225 mL        PHYSICAL EXAM:  Constitutional: Well developed, well nourished, NAD  Chest: Symmetric chest rise bilaterally  ENT: CHRISTIANE drain SS. Tegaderm and gauze c/d/i on R CEA site   Abdomen: Soft, nontender, nondistended, appropriate sx incisional tenderness  Groin: Soft, nontender, no ecchymosis/hematoma, no erythema, no edema.  Extremities:  Warm to touch, soft  Neuro: b/l 5/5 motor strength. b/l 5/5 sensation. Cranial Nerve intact   ----------------------------------------------------------------------------------------------------------------------------------------------------------------------------------------------------------------  ASSESSMENT: HPI:  70 y/o M with PMHx significant for CAD, HTN, HLD, Insulin Dependant DM,   CVA ([place], 2022) reports experiencing symptoms of fatigue and intermittent dizziness. He was found to have significant Right Carotid Artery Stenosis on Doppler. Patient is scheduled for Right Carotid Endarterectomy with EEG with Dr. Vargas on 04/03/2025. (19 Mar 2025 08:45)  .   Pt is s/p ; POD #0    PLAN:  - neuro checks    - Diet: Regular   - Analgesia and antiemetics as needed  - Strict I&O's   - Incentive spirometry  - OOB as tolerated  - DVT prophylaxis: SCD, aspirin/plavix, SQH   - Monitor overnight  - Dispo: Discharge home in AM

## 2025-04-04 ENCOUNTER — TRANSCRIPTION ENCOUNTER (OUTPATIENT)
Age: 70
End: 2025-04-04

## 2025-04-04 VITALS
OXYGEN SATURATION: 96 % | SYSTOLIC BLOOD PRESSURE: 150 MMHG | DIASTOLIC BLOOD PRESSURE: 67 MMHG | HEART RATE: 52 BPM | RESPIRATION RATE: 14 BRPM

## 2025-04-04 DIAGNOSIS — I25.10 ATHEROSCLEROTIC HEART DISEASE OF NATIVE CORONARY ARTERY WITHOUT ANGINA PECTORIS: ICD-10-CM

## 2025-04-04 DIAGNOSIS — E03.9 HYPOTHYROIDISM, UNSPECIFIED: ICD-10-CM

## 2025-04-04 DIAGNOSIS — I10 ESSENTIAL (PRIMARY) HYPERTENSION: ICD-10-CM

## 2025-04-04 LAB
ANION GAP SERPL CALC-SCNC: 15 MMOL/L — SIGNIFICANT CHANGE UP (ref 5–17)
BUN SERPL-MCNC: 64 MG/DL — HIGH (ref 7–23)
CALCIUM SERPL-MCNC: 8.8 MG/DL — SIGNIFICANT CHANGE UP (ref 8.4–10.5)
CHLORIDE SERPL-SCNC: 106 MMOL/L — SIGNIFICANT CHANGE UP (ref 96–108)
CO2 SERPL-SCNC: 19 MMOL/L — LOW (ref 22–31)
CREAT SERPL-MCNC: 3.97 MG/DL — HIGH (ref 0.5–1.3)
EGFR: 15 ML/MIN/1.73M2 — LOW
EGFR: 15 ML/MIN/1.73M2 — LOW
GLUCOSE BLDC GLUCOMTR-MCNC: 181 MG/DL — HIGH (ref 70–99)
GLUCOSE BLDC GLUCOMTR-MCNC: 207 MG/DL — HIGH (ref 70–99)
GLUCOSE SERPL-MCNC: 162 MG/DL — HIGH (ref 70–99)
HCT VFR BLD CALC: 30.3 % — LOW (ref 39–50)
HGB BLD-MCNC: 10.2 G/DL — LOW (ref 13–17)
MAGNESIUM SERPL-MCNC: 2 MG/DL — SIGNIFICANT CHANGE UP (ref 1.6–2.6)
MCHC RBC-ENTMCNC: 29.3 PG — SIGNIFICANT CHANGE UP (ref 27–34)
MCHC RBC-ENTMCNC: 33.7 G/DL — SIGNIFICANT CHANGE UP (ref 32–36)
MCV RBC AUTO: 87.1 FL — SIGNIFICANT CHANGE UP (ref 80–100)
NRBC BLD AUTO-RTO: 0 /100 WBCS — SIGNIFICANT CHANGE UP (ref 0–0)
PHOSPHATE SERPL-MCNC: 4 MG/DL — SIGNIFICANT CHANGE UP (ref 2.5–4.5)
PLATELET # BLD AUTO: 205 K/UL — SIGNIFICANT CHANGE UP (ref 150–400)
POTASSIUM SERPL-MCNC: 3.8 MMOL/L — SIGNIFICANT CHANGE UP (ref 3.5–5.3)
POTASSIUM SERPL-SCNC: 3.8 MMOL/L — SIGNIFICANT CHANGE UP (ref 3.5–5.3)
RBC # BLD: 3.48 M/UL — LOW (ref 4.2–5.8)
RBC # FLD: 11.4 % — SIGNIFICANT CHANGE UP (ref 10.3–14.5)
SODIUM SERPL-SCNC: 140 MMOL/L — SIGNIFICANT CHANGE UP (ref 135–145)
WBC # BLD: 10.42 K/UL — SIGNIFICANT CHANGE UP (ref 3.8–10.5)
WBC # FLD AUTO: 10.42 K/UL — SIGNIFICANT CHANGE UP (ref 3.8–10.5)

## 2025-04-04 PROCEDURE — 82947 ASSAY GLUCOSE BLOOD QUANT: CPT

## 2025-04-04 PROCEDURE — C1768: CPT

## 2025-04-04 PROCEDURE — 85014 HEMATOCRIT: CPT

## 2025-04-04 PROCEDURE — 84132 ASSAY OF SERUM POTASSIUM: CPT

## 2025-04-04 PROCEDURE — 82962 GLUCOSE BLOOD TEST: CPT

## 2025-04-04 PROCEDURE — 82330 ASSAY OF CALCIUM: CPT

## 2025-04-04 PROCEDURE — 82803 BLOOD GASES ANY COMBINATION: CPT

## 2025-04-04 PROCEDURE — 88300 SURGICAL PATH GROSS: CPT

## 2025-04-04 PROCEDURE — 85018 HEMOGLOBIN: CPT

## 2025-04-04 PROCEDURE — 82435 ASSAY OF BLOOD CHLORIDE: CPT

## 2025-04-04 PROCEDURE — 80048 BASIC METABOLIC PNL TOTAL CA: CPT

## 2025-04-04 PROCEDURE — 83735 ASSAY OF MAGNESIUM: CPT

## 2025-04-04 PROCEDURE — 83605 ASSAY OF LACTIC ACID: CPT

## 2025-04-04 PROCEDURE — 84295 ASSAY OF SERUM SODIUM: CPT

## 2025-04-04 PROCEDURE — 84100 ASSAY OF PHOSPHORUS: CPT

## 2025-04-04 PROCEDURE — C1889: CPT

## 2025-04-04 PROCEDURE — 85027 COMPLETE CBC AUTOMATED: CPT

## 2025-04-04 RX ORDER — CALCIUM GLUCONATE 20 MG/ML
2 INJECTION, SOLUTION INTRAVENOUS ONCE
Refills: 0 | Status: COMPLETED | OUTPATIENT
Start: 2025-04-04 | End: 2025-04-04

## 2025-04-04 RX ORDER — OXYCODONE HYDROCHLORIDE 30 MG/1
5 TABLET ORAL EVERY 4 HOURS
Refills: 0 | Status: DISCONTINUED | OUTPATIENT
Start: 2025-04-04 | End: 2025-04-04

## 2025-04-04 RX ORDER — ASPIRIN 325 MG
1 TABLET ORAL
Qty: 0 | Refills: 0 | DISCHARGE
Start: 2025-04-04

## 2025-04-04 RX ORDER — INSULIN LISPRO 100 U/ML
INJECTION, SOLUTION INTRAVENOUS; SUBCUTANEOUS
Refills: 0 | Status: DISCONTINUED | OUTPATIENT
Start: 2025-04-04 | End: 2025-04-04

## 2025-04-04 RX ORDER — POLYETHYLENE GLYCOL 3350 17 G/17G
17 POWDER, FOR SOLUTION ORAL DAILY
Refills: 0 | Status: DISCONTINUED | OUTPATIENT
Start: 2025-04-04 | End: 2025-04-04

## 2025-04-04 RX ORDER — OXYCODONE HYDROCHLORIDE 30 MG/1
1 TABLET ORAL
Qty: 5 | Refills: 0
Start: 2025-04-04

## 2025-04-04 RX ORDER — INSULIN LISPRO 100 U/ML
INJECTION, SOLUTION INTRAVENOUS; SUBCUTANEOUS AT BEDTIME
Refills: 0 | Status: DISCONTINUED | OUTPATIENT
Start: 2025-04-04 | End: 2025-04-04

## 2025-04-04 RX ORDER — SENNA 187 MG
1 TABLET ORAL DAILY
Refills: 0 | Status: DISCONTINUED | OUTPATIENT
Start: 2025-04-04 | End: 2025-04-04

## 2025-04-04 RX ORDER — OXYCODONE HYDROCHLORIDE 30 MG/1
2.5 TABLET ORAL EVERY 4 HOURS
Refills: 0 | Status: DISCONTINUED | OUTPATIENT
Start: 2025-04-04 | End: 2025-04-04

## 2025-04-04 RX ADMIN — METOPROLOL SUCCINATE 50 MILLIGRAM(S): 50 TABLET, EXTENDED RELEASE ORAL at 05:52

## 2025-04-04 RX ADMIN — CALCIUM GLUCONATE 200 GRAM(S): 20 INJECTION, SOLUTION INTRAVENOUS at 01:38

## 2025-04-04 RX ADMIN — CLOPIDOGREL BISULFATE 75 MILLIGRAM(S): 75 TABLET, FILM COATED ORAL at 11:05

## 2025-04-04 RX ADMIN — OXYCODONE HYDROCHLORIDE 5 MILLIGRAM(S): 30 TABLET ORAL at 00:46

## 2025-04-04 RX ADMIN — HEPARIN SODIUM 5000 UNIT(S): 1000 INJECTION INTRAVENOUS; SUBCUTANEOUS at 11:04

## 2025-04-04 RX ADMIN — HEPARIN SODIUM 5000 UNIT(S): 1000 INJECTION INTRAVENOUS; SUBCUTANEOUS at 04:13

## 2025-04-04 RX ADMIN — INSULIN LISPRO 2: 100 INJECTION, SOLUTION INTRAVENOUS; SUBCUTANEOUS at 07:14

## 2025-04-04 RX ADMIN — INSULIN LISPRO 4: 100 INJECTION, SOLUTION INTRAVENOUS; SUBCUTANEOUS at 10:41

## 2025-04-04 RX ADMIN — Medication 50 MILLIGRAM(S): at 05:48

## 2025-04-04 RX ADMIN — Medication 975 MILLIGRAM(S): at 02:00

## 2025-04-04 RX ADMIN — Medication 975 MILLIGRAM(S): at 02:30

## 2025-04-04 RX ADMIN — Medication 81 MILLIGRAM(S): at 11:05

## 2025-04-04 RX ADMIN — OXYCODONE HYDROCHLORIDE 5 MILLIGRAM(S): 30 TABLET ORAL at 00:16

## 2025-04-04 RX ADMIN — Medication 90 MILLIGRAM(S): at 05:48

## 2025-04-04 RX ADMIN — Medication 50 MICROGRAM(S): at 05:48

## 2025-04-04 NOTE — DISCHARGE NOTE NURSING/CASE MANAGEMENT/SOCIAL WORK - PATIENT PORTAL LINK FT
You can access the FollowMyHealth Patient Portal offered by [facility] by registering at the following website: [email or web address]. By joining [facility]’s FollowMyHealth portal, you will also be able to view your health information using other applications (apps) compatible with our system.

## 2025-04-04 NOTE — CONSULT NOTE ADULT - ASSESSMENT
69 year old male with PMH of CKD stage 5, CAD, HTN, HLD, DM, and history of CVA who presents to the hospital for scheduled right carotid endarterectomy today. The nephrology team was consulted for management of CKD.    CKD stage 5   kidney disase in setting of DM/HTN   Nephrologist: DR. Abdul; last seen in office 3/31/25   baseline kidney function with SCr ~4.5mg/dL   euvolemic on examination     plan   labs reviewed and volume status is okay   SCr stable at this time   no urgent need for RRT at this time   discussed with pt and family that should the need arise during this hospitalization they are amenable to starting HD   will monitor labs an volume status carefully   Monitor labs and urine output. Dose medications as per eGFR.    If any questions, please feel free to contact me     Silvino Cedeno  Nephrology Attending  Cell #[phone]      
ASSESSMENT: 69 yom s/p CEA hypertensive iso systolic goals 110-160.    PLAN:    Neuro:  - AOx4  - Multimodal pain control w/ oxy and tylenol    Resp:  - Out of bed to chair, ambulate as tolerated, and incentive spirometry to prevent atelectasis    CV:  - goal MAP > 65 mmHg  - systolic goals 110-160  - cardene ggt  - home PO hydral, nifedipine, metoprolol    GI:   - Diet: CC regular  - Bowel regimen with senna & Miralax    Renal:  - Monitor I&Os and electrolytes w/ repletions as necessary  - home lasix  - NS @ 75    Heme:  - Monitor CBC and coags  - SQH for VTE prophylaxis  - ASA/plavix  - SCDs for VTE prophylaxis    ID:   - Monitor for clinical evidence of active infection  - Monitor WBC, temperature, and procalcitonin    Endo:   - Monitor glucose    Code Status: full    Disposition: SICU    The above case discussed with SICU attending Dany Brooke MD.    Mauro Patiño PAColbyC  Surgical Intensive Care Unit  [id number]
68 y/o M with PMHx significant for CAD, HTN, HLD, Insulin Dependant DM,   CVA ([place], 2022) reports experiencing symptoms of fatigue and intermittent dizziness. He was found to have significant Right Carotid Artery Stenosis on Doppler. Patient is scheduled for Right Carotid Endarterectomy with EEG with Dr. Vargas on 04/03/2025. SICU c/s post op for hypertension iso systolic goals 110-1160.  Has been off cardene gtt since 6am   feels ok   no chest pain or shortness of breath

## 2025-04-04 NOTE — CONSULT NOTE ADULT - SUBJECTIVE AND OBJECTIVE BOX
CHIEF COMPLAINT:  Asked buy Dr. Mercado, patients PCP and cardiologist top see patient   HISTORY OF PRESENT ILLNESS:   68 y/o M with PMHx significant for CAD, HTN, HLD, Insulin Dependant DM,   CVA ([place], 2022) reports experiencing symptoms of fatigue and intermittent dizziness. He was found to have significant Right Carotid Artery Stenosis on Doppler. Patient is scheduled for Right Carotid Endarterectomy with EEG with Dr. Vargas on 04/03/2025. SICU c/s post op for hypertension iso systolic goals 110-1160.    PAST MEDICAL & SURGICAL HISTORY:  HTN (hypertension)      Hypercholesterolemia      CAD (coronary artery disease)      Diabetes mellitus, type II, insulin dependent      CVA (cerebrovascular accident)      Hypothyroidism      H/O colonoscopy              MEDICATIONS:  aspirin enteric coated 81 milliGRAM(s) Oral daily  clopidogrel Tablet 75 milliGRAM(s) Oral daily  heparin   Injectable 5000 Unit(s) SubCutaneous every 8 hours  hydrALAZINE 50 milliGRAM(s) Oral three times a day  metoprolol tartrate 50 milliGRAM(s) Oral two times a day  niCARdipine Infusion 5 mG/Hr IV Continuous <Continuous>  NIFEdipine XL 90 milliGRAM(s) Oral daily        acetaminophen     Tablet .. 975 milliGRAM(s) Oral every 6 hours  ondansetron Injectable 4 milliGRAM(s) IV Push once PRN  oxyCODONE    IR 2.5 milliGRAM(s) Oral every 4 hours PRN  oxyCODONE    IR 5 milliGRAM(s) Oral every 4 hours PRN    polyethylene glycol 3350 17 Gram(s) Oral daily  senna 1 Tablet(s) Oral daily    insulin glargine Injectable (LANTUS) 10 Unit(s) SubCutaneous at bedtime  insulin lispro (ADMELOG) corrective regimen sliding scale   SubCutaneous three times a day before meals  insulin lispro (ADMELOG) corrective regimen sliding scale   SubCutaneous at bedtime  levothyroxine 50 MICROGram(s) Oral daily        FAMILY HISTORY:      SOCIAL HISTORY:    [ ] Non-smoker  [ ] Smoker  [ ] Alcohol    Allergies    No Known Allergies    Intolerances    	    REVIEW OF SYSTEMS:  CONSTITUTIONAL: No fever, weight loss, + fatigue  EYES: No eye pain, visual disturbances, or discharge  ENMT:  No difficulty hearing, tinnitus, vertigo; No sinus or throat pain  NECK: No pain or stiffness  RESPIRATORY: No cough, wheezing, chills or hemoptysis; No Shortness of Breath  CARDIOVASCULAR: No chest pain, palpitations, passing out, dizziness, or leg swelling  GASTROINTESTINAL: No abdominal or epigastric pain. No nausea, vomiting, or hematemesis; No diarrhea or constipation. No melena or hematochezia.  GENITOURINARY: No dysuria, frequency, hematuria, or incontinence  NEUROLOGICAL: No headaches, memory loss, loss of strength, numbness, or tremors  SKIN: No itching, burning, rashes, or lesions   LYMPH Nodes: No enlarged glands  ENDOCRINE: No heat or cold intolerance; No hair loss  MUSCULOSKELETAL: No joint pain or swelling; No muscle, back, or extremity pain  PSYCHIATRIC: No depression, anxiety, mood swings, or difficulty sleeping  HEME/LYMPH: No easy bruising, or bleeding gums  ALLERY AND IMMUNOLOGIC: No hives or eczema	    [ ] All others negative	  [ ] Unable to obtain  Mechanical Ventilation:   ABG - ( 03 Apr 2025 11:42 )  pH: 7.32  /  pCO2: 39    /  pO2: 239   / HCO3: 20    / Base Excess: -5.6  /  SaO2: 97.5    Lactate: x      PHYSICAL EXAM:  T(C): 36.4 (04-04-25 @ 08:00), Max: 36.6 (04-03-25 @ 20:00)  HR: 53 (04-04-25 @ 09:00) (47 - 84)  BP: 131/61 (04-04-25 @ 07:45) (130/60 - 174/22)  RR: 16 (04-04-25 @ 09:00) (13 - 18)  SpO2: 98% (04-04-25 @ 09:00) (93% - 98%)  Wt(kg): --  I&O's Summary    03 Apr 2025 07:01  -  04 Apr 2025 07:00  --------------------------------------------------------  IN: 1737.5 mL / OUT: 1597 mL / NET: 140.5 mL    04 Apr 2025 07:01  -  04 Apr 2025 09:07  --------------------------------------------------------  IN: 200 mL / OUT: 0 mL / NET: 200 mL        Appearance: Normal	  HEENT:   Normal oral mucosa, PERRL, EOMI	  Lymphatic: No lymphadenopathy  Cardiovascular: Normal S1 S2, No JVD, No murmurs, No edema  Respiratory: Lungs clear to auscultation	  Psychiatry: A & O x 3, Mood & affect appropriate  Gastrointestinal:  Soft, Non-tender, + BS	  Skin: No rashes, No ecchymoses, No cyanosis	  Neurologic: Non-focal  Extremities: Normal range of motion, No clubbing, cyanosis or edema  Vascular: Peripheral pulses palpable 2+ bilaterally    TELEMETRY: 	    ECG:  	  RADIOLOGY:  OTHER: 	  	  LABS:	 	    CARDIAC MARKERS:                                  10.2   10.42 )-----------( 205      ( 04 Apr 2025 04:41 )             30.3     04-04    140  |  106  |  64[H]  ----------------------------<  162[H]  3.8   |  19[L]  |  3.97[H]    Ca    8.8      04 Apr 2025 04:41  Phos  4.0     04-04  Mg     2.0     04-04      proBNP:   Lipid Profile:   HgA1c:   TSH:            CHIEF COMPLAINT:  Asked buy Dr. Mercado, patients PCP and cardiologist top see patient   HISTORY OF PRESENT ILLNESS:   70 y/o M with PMHx significant for CAD, HTN, HLD, Insulin Dependant DM,   CVA ([place], 2022) reports experiencing symptoms of fatigue and intermittent dizziness. He was found to have significant Right Carotid Artery Stenosis on Doppler. Patient is scheduled for Right Carotid Endarterectomy with EEG with Dr. Vargas on 04/03/2025. SICU c/s post op for hypertension iso systolic goals 110-1160.  Has been off cardene gtt since 6am   feels ok   no chest pain or shortness of breath     PAST MEDICAL & SURGICAL HISTORY:  HTN (hypertension)      Hypercholesterolemia      CAD (coronary artery disease)      Diabetes mellitus, type II, insulin dependent      CVA (cerebrovascular accident)      Hypothyroidism      H/O colonoscopy              MEDICATIONS:  aspirin enteric coated 81 milliGRAM(s) Oral daily  clopidogrel Tablet 75 milliGRAM(s) Oral daily  heparin   Injectable 5000 Unit(s) SubCutaneous every 8 hours  hydrALAZINE 50 milliGRAM(s) Oral three times a day  metoprolol tartrate 50 milliGRAM(s) Oral two times a day  niCARdipine Infusion 5 mG/Hr IV Continuous <Continuous>  NIFEdipine XL 90 milliGRAM(s) Oral daily        acetaminophen     Tablet .. 975 milliGRAM(s) Oral every 6 hours  ondansetron Injectable 4 milliGRAM(s) IV Push once PRN  oxyCODONE    IR 2.5 milliGRAM(s) Oral every 4 hours PRN  oxyCODONE    IR 5 milliGRAM(s) Oral every 4 hours PRN    polyethylene glycol 3350 17 Gram(s) Oral daily  senna 1 Tablet(s) Oral daily    insulin glargine Injectable (LANTUS) 10 Unit(s) SubCutaneous at bedtime  insulin lispro (ADMELOG) corrective regimen sliding scale   SubCutaneous three times a day before meals  insulin lispro (ADMELOG) corrective regimen sliding scale   SubCutaneous at bedtime  levothyroxine 50 MICROGram(s) Oral daily        FAMILY HISTORY:      SOCIAL HISTORY:    [ ] Non-smoker  [ ] Smoker  [ ] Alcohol    Allergies    No Known Allergies    Intolerances    	    REVIEW OF SYSTEMS:  CONSTITUTIONAL: No fever, weight loss, + fatigue  EYES: No eye pain, visual disturbances, or discharge  ENMT:  No difficulty hearing, tinnitus, vertigo; No sinus or throat pain  NECK: No pain or stiffness  RESPIRATORY: No cough, wheezing, chills or hemoptysis; No Shortness of Breath  CARDIOVASCULAR: No chest pain, palpitations, passing out, dizziness, or leg swelling  GASTROINTESTINAL: No abdominal or epigastric pain. No nausea, vomiting, or hematemesis; No diarrhea or constipation. No melena or hematochezia.  GENITOURINARY: No dysuria, frequency, hematuria, or incontinence  NEUROLOGICAL: No headaches, memory loss, loss of strength, numbness, or tremors  SKIN: No itching, burning, rashes, or lesions   LYMPH Nodes: No enlarged glands  ENDOCRINE: No heat or cold intolerance; No hair loss  MUSCULOSKELETAL: No joint pain or swelling; No muscle, back, or extremity pain  PSYCHIATRIC: No depression, anxiety, mood swings, or difficulty sleeping  HEME/LYMPH: No easy bruising, or bleeding gums  ALLERY AND IMMUNOLOGIC: No hives or eczema	    [ ] All others negative	  [ ] Unable to obtain  Mechanical Ventilation:   ABG - ( 03 Apr 2025 11:42 )  pH: 7.32  /  pCO2: 39    /  pO2: 239   / HCO3: 20    / Base Excess: -5.6  /  SaO2: 97.5    Lactate: x      PHYSICAL EXAM:  T(C): 36.4 (04-04-25 @ 08:00), Max: 36.6 (04-03-25 @ 20:00)  HR: 53 (04-04-25 @ 09:00) (47 - 84)  BP: 131/61 (04-04-25 @ 07:45) (130/60 - 174/22)  RR: 16 (04-04-25 @ 09:00) (13 - 18)  SpO2: 98% (04-04-25 @ 09:00) (93% - 98%)  Wt(kg): --  I&O's Summary    03 Apr 2025 07:01  -  04 Apr 2025 07:00  --------------------------------------------------------  IN: 1737.5 mL / OUT: 1597 mL / NET: 140.5 mL    04 Apr 2025 07:01  -  04 Apr 2025 09:07  --------------------------------------------------------  IN: 200 mL / OUT: 0 mL / NET: 200 mL        Appearance: NAD  HEENT:   Dry oral mucosa, PERRL, EOMI	  L cervical drain  Lymphatic: No lymphadenopathy  Cardiovascular: Normal S1 S2, No JVD, No murmurs, No edema  Respiratory: Lungs clear to auscultation	  Psychiatry: A & O x 3, Mood & affect appropriate  Gastrointestinal:  Soft, Non-tender, + BS	  Skin: No rashes, No ecchymoses, No cyanosis	  Neurologic: Non-focal  Extremities: Normal range of motion, No clubbing, cyanosis or edema  Vascular: Peripheral pulses palpable 2+ bilaterally    TELEMETRY: 	  SR   ECG:  	  RADIOLOGY:    OTHER: 	  	  LABS:	 	    CARDIAC MARKERS:                                  10.2   10.42 )-----------( 205      ( 04 Apr 2025 04:41 )             30.3     04-04    140  |  106  |  64[H]  ----------------------------<  162[H]  3.8   |  19[L]  |  3.97[H]    Ca    8.8      04 Apr 2025 04:41  Phos  4.0     04-04  Mg     2.0     04-04      proBNP:   Lipid Profile:   HgA1c:   TSH:

## 2025-04-04 NOTE — PROGRESS NOTE ADULT - SUBJECTIVE AND OBJECTIVE BOX
[facility] : Ans Serv [phone], Office [phone]  Dr. Cedeno/Dr Abdul/Dr Fierro  /Dr Derrek bobo /Dr MARCO Alejandro/Dr Bradly Talamantes/Dr Melo Guillaume /Dr HAILY Singer  _______________________________________________________________________________________________    Pt seen and examined this morning   s/p R carotid endarterectomy   no acute complaints     VITALS:  T(F): 97.7 (04-04 @ 04:00), Max: 97.9 (04-03 @ 20:00)  HR: 58 (04-04 @ 06:30)  BP: 143/66 (04-04 @ 06:30)  ABP: 133/41 (04-04 @ 06:30)  RR: 15 (04-04 @ 06:30)  SpO2: 96% (04-04 @ 06:30)    04-03 @ 07:01  -  04-04 @ 06:51  --------------------------------------------------------  IN: 1637.5 mL / OUT: 1597 mL / NET: 40.5 mL      Physical Exam :-  Constitutional: NAD  Respiratory: Bilateral equal breath sounds, no Crackles present.  Cardiovascular: S1, S2 normal  Gastrointestinal: Bowel Sounds present, soft  Extremities: trace Edema Feet  Neurological: Alert and Oriented x 3  Psychiatric: Normal mood, normal affect    Data:-  Allergies :   No Known Allergies    Hospital Medications:   MEDICATIONS  (STANDING):  acetaminophen     Tablet .. 975 milliGRAM(s) Oral every 6 hours  aspirin enteric coated 81 milliGRAM(s) Oral daily  clopidogrel Tablet 75 milliGRAM(s) Oral daily  heparin   Injectable 5000 Unit(s) SubCutaneous every 8 hours  hydrALAZINE 50 milliGRAM(s) Oral three times a day  insulin glargine Injectable (LANTUS) 10 Unit(s) SubCutaneous at bedtime  insulin lispro (ADMELOG) corrective regimen sliding scale   SubCutaneous three times a day before meals  insulin lispro (ADMELOG) corrective regimen sliding scale   SubCutaneous at bedtime  levothyroxine 50 MICROGram(s) Oral daily  metoprolol tartrate 50 milliGRAM(s) Oral two times a day  niCARdipine Infusion 5 mG/Hr (25 mL/Hr) IV Continuous <Continuous>  NIFEdipine XL 90 milliGRAM(s) Oral daily  polyethylene glycol 3350 17 Gram(s) Oral daily  senna 1 Tablet(s) Oral daily    04-04    140  |  106  |  64[H]  ----------------------------<  162[H]  3.8   |  19[L]  |  3.97[H]    Ca    8.8      04 Apr 2025 04:41  Phos  4.0     04-04  Mg     2.0     04-04      Creatinine Trend: 3.97 <--  egfr trend : 15 <--, 12 <--                        10.2   10.42 )-----------( 205      ( 04 Apr 2025 04:41 )             30.3   
SURGERY DAILY PROGRESS NOTE    24 Hour/Overnight Events:   - Nicardipine gtt turned off  - Failed TOV once, TOV @8 am     SUBJECTIVE: Patient seen and evaluated on AM rounds. Pt is resting comfortably in bed with no acute complaints. Tolerating diet. Ambulating well.   Denies fevers/chills, chest pain, dyspnea, abdominal pain, nausea, vomiting, and diarrhea    ------------------------------------------------------------------------------------------------------------  OBJECTIVE:  Vital Signs Last 24 Hrs  T(C): 36.4 (04 Apr 2025 08:00), Max: 36.6 (03 Apr 2025 20:00)  T(F): 97.5 (04 Apr 2025 08:00), Max: 97.9 (03 Apr 2025 20:00)  HR: 47 (04 Apr 2025 08:00) (47 - 84)  BP: 131/61 (04 Apr 2025 07:45) (130/60 - 174/22)  BP(mean): 88 (04 Apr 2025 07:45) (86 - 110)  RR: 18 (04 Apr 2025 08:00) (13 - 18)  SpO2: 96% (04 Apr 2025 08:00) (93% - 98%)    Parameters below as of 04 Apr 2025 07:30  Patient On (Oxygen Delivery Method): room air      I&O's Detail    03 Apr 2025 07:01  -  04 Apr 2025 07:00  --------------------------------------------------------  IN:    IV PiggyBack: 100 mL    NiCARdipine: 337.5 mL    Oral Fluid: 325 mL    sodium chloride 0.9%: 975 mL  Total IN: 1737.5 mL    OUT:    Bulb (mL): 37 mL    Intermittent Catheterization - Urethral (mL): 1200 mL    Voided (mL): 360 mL  Total OUT: 1597 mL    Total NET: 140.5 mL      04 Apr 2025 07:01  -  04 Apr 2025 08:12  --------------------------------------------------------  IN:  Total IN: 0 mL    OUT:    NiCARdipine: 0 mL  Total OUT: 0 mL    Total NET: 0 mL          LABS:                        10.2   10.42 )-----------( 205      ( 04 Apr 2025 04:41 )             30.3     04-04    140  |  106  |  64[H]  ----------------------------<  162[H]  3.8   |  19[L]  |  3.97[H]    Ca    8.8      04 Apr 2025 04:41  Phos  4.0     04-04  Mg     2.0     04-04          Urinalysis Basic - ( 04 Apr 2025 04:41 )    Color: x / Appearance: x / SG: x / pH: x  Gluc: 162 mg/dL / Ketone: x  / Bili: x / Urobili: x   Blood: x / Protein: x / Nitrite: x   Leuk Esterase: x / RBC: x / WBC x   Sq Epi: x / Non Sq Epi: x / Bacteria: x    PHYSICAL EXAM:  Constitutional: Well developed, well nourished, NAD  Chest: Symmetric chest rise bilaterally  ENT: CHRISTIANE drain SS. Tegaderm and gauze c/d/i on R CEA site   Abdomen: Soft, nontender, nondistended, appropriate sx incisional tenderness  Groin: Soft, nontender, no ecchymosis/hematoma, no erythema, no edema.  Extremities:  Warm to touch, soft  Neuro: b/l 5/5 motor strength. b/l 5/5 sensation. Cranial Nerve intact

## 2025-04-04 NOTE — CONSULT NOTE ADULT - SUBJECTIVE AND OBJECTIVE BOX
HISTORY OF PRESENT ILLNESS:  ALEXANDRA CLEMENTS is a 69y Male     PAST MEDICAL HISTORY: HTN (hypertension)    DM (diabetes mellitus)    Hypercholesterolemia    CAD (coronary artery disease)    Diabetes mellitus, type II, insulin dependent    CVA (cerebrovascular accident)    Hypothyroidism        PAST SURGICAL HISTORY: No significant past surgical history    H/O colonoscopy        FAMILY HISTORY:     SOCIAL HISTORY:    CODE STATUS:     HOME MEDICATIONS:    ALLERGIES: No Known Allergies      VITAL SIGNS:  ICU Vital Signs Last 24 Hrs  T(C): 36.5 (04 Apr 2025 00:00), Max: 36.6 (03 Apr 2025 20:00)  T(F): 97.7 (04 Apr 2025 00:00), Max: 97.9 (03 Apr 2025 20:00)  HR: 59 (04 Apr 2025 00:45) (55 - 84)  BP: 169/74 (03 Apr 2025 20:45) (155/71 - 174/22)  BP(mean): 107 (03 Apr 2025 20:45) (107 - 110)  ABP: 146/48 (04 Apr 2025 00:45) (146/48 - 181/56)  ABP(mean): 86 (04 Apr 2025 00:45) (80 - 103)  RR: 15 (04 Apr 2025 00:30) (13 - 16)  SpO2: 96% (04 Apr 2025 00:45) (93% - 98%)    O2 Parameters below as of 04 Apr 2025 00:00  Patient On (Oxygen Delivery Method): room air            NEURO  Exam:  acetaminophen     Tablet .. 975 milliGRAM(s) Oral every 6 hours  fentaNYL    Injectable 25 MICROGram(s) IV Push every 5 minutes PRN Moderate Pain (4 - 6)  fentaNYL    Injectable 50 MICROGram(s) IV Push every 5 minutes PRN Severe Pain (7 - 10)  ondansetron Injectable 4 milliGRAM(s) IV Push once PRN Nausea and/or Vomiting      RESPIRATORY  Mechanical Ventilation:   ABG - ( 03 Apr 2025 11:42 )  pH: 7.32  /  pCO2: 39    /  pO2: 239   / HCO3: 20    / Base Excess: -5.6  /  SaO2: 97.5    Lactate: x                Exam:      CARDIOVASCULAR    Exam:  Cardiac Rhythm:  hydrALAZINE 50 milliGRAM(s) Oral three times a day  metoprolol tartrate 50 milliGRAM(s) Oral two times a day  niCARdipine Infusion 5 mG/Hr IV Continuous <Continuous>  NIFEdipine XL 90 milliGRAM(s) Oral daily      GI/NUTRITION  Exam:  Diet:      GENITOURINARY/RENAL  calcium gluconate IVPB 2 Gram(s) IV Intermittent once  dextrose 5%. 1000 milliLiter(s) IV Continuous <Continuous>  dextrose 5%. 1000 milliLiter(s) IV Continuous <Continuous>  sodium chloride 0.9%. 1000 milliLiter(s) IV Continuous <Continuous>      04-03 @ 07:01  -  04-04 @ 01:13  --------------------------------------------------------  IN:    NiCARdipine: 187.5 mL    Oral Fluid: 150 mL    sodium chloride 0.9%: 600 mL  Total IN: 937.5 mL    OUT:    Bulb (mL): 30 mL    Intermittent Catheterization - Urethral (mL): 1200 mL    Voided (mL): 0 mL  Total OUT: 1230 mL    Total NET: -292.5 mL                [ ] Mckeon catheter, indication: urine output monitoring in critically ill patient    HEMATOLOGIC  [ ] VTE Prophylaxis:  aspirin enteric coated 81 milliGRAM(s) Oral daily  clopidogrel Tablet 75 milliGRAM(s) Oral daily  heparin   Injectable 5000 Unit(s) SubCutaneous every 8 hours        Transfusion: [ ] PRBC	[ ] Platelets	[ ] FFP	[ ] Cryoprecipitate      INFECTIOUS DISEASES    RECENT CULTURES:      ENDOCRINE  dextrose 50% Injectable 25 Gram(s) IV Push once  dextrose 50% Injectable 12.5 Gram(s) IV Push once  dextrose 50% Injectable 25 Gram(s) IV Push once  dextrose Oral Gel 15 Gram(s) Oral once PRN  glucagon  Injectable 1 milliGRAM(s) IntraMuscular once  insulin glargine Injectable (LANTUS) 10 Unit(s) SubCutaneous at bedtime  insulin lispro (ADMELOG) corrective regimen sliding scale   SubCutaneous every 6 hours  levothyroxine 50 MICROGram(s) Oral daily    CAPILLARY BLOOD GLUCOSE      POCT Blood Glucose.: 243 mg/dL (03 Apr 2025 23:29)  POCT Blood Glucose.: 256 mg/dL (03 Apr 2025 22:08)  POCT Blood Glucose.: 275 mg/dL (03 Apr 2025 14:33)  POCT Blood Glucose.: 205 mg/dL (03 Apr 2025 09:21)      PATIENT CARE ACCESS DEVICES:  [ ] Peripheral IV  [ ] Central Venous Line	[ ] R	[ ] L	[ ] IJ	[ ] Fem	[ ] SC	Placed:   [ ] Arterial Line		[ ] R	[ ] L	[ ] Fem	[ ] Rad	[ ] Ax	Placed:   [ ] PICC:					[ ] Mediport  [ ] Urinary Catheter, Date Placed:   [x] Necessity of urinary, arterial, and venous catheters discussed    OTHER MEDICATIONS:     IMAGING STUDIES: HISTORY OF PRESENT ILLNESS: 70 y/o M with PMHx significant for CAD, HTN, HLD, Insulin Dependant DM,   CVA (Left [place]ine, 2022) reports experiencing symptoms of fatigue and intermittent dizziness. He was found to have significant Right Carotid Artery Stenosis on Doppler. Patient is scheduled for Right Carotid Endarterectomy with EEG with Dr. Vargas on 04/03/2025. SICU c/s post op for hypertension iso systolic goals 110-1160.    PAST MEDICAL HISTORY: HTN (hypertension)    DM (diabetes mellitus)    Hypercholesterolemia    CAD (coronary artery disease)    Diabetes mellitus, type II, insulin dependent    CVA (cerebrovascular accident)    Hypothyroidism        PAST SURGICAL HISTORY: No significant past surgical history    H/O colonoscopy        FAMILY HISTORY:     SOCIAL HISTORY:    CODE STATUS:     HOME MEDICATIONS:    ALLERGIES: No Known Allergies      VITAL SIGNS:  ICU Vital Signs Last 24 Hrs  T(C): 36.5 (04 Apr 2025 00:00), Max: 36.6 (03 Apr 2025 20:00)  T(F): 97.7 (04 Apr 2025 00:00), Max: 97.9 (03 Apr 2025 20:00)  HR: 59 (04 Apr 2025 00:45) (55 - 84)  BP: 169/74 (03 Apr 2025 20:45) (155/71 - 174/22)  BP(mean): 107 (03 Apr 2025 20:45) (107 - 110)  ABP: 146/48 (04 Apr 2025 00:45) (146/48 - 181/56)  ABP(mean): 86 (04 Apr 2025 00:45) (80 - 103)  RR: 15 (04 Apr 2025 00:30) (13 - 16)  SpO2: 96% (04 Apr 2025 00:45) (93% - 98%)    O2 Parameters below as of 04 Apr 2025 00:00  Patient On (Oxygen Delivery Method): room air            NEURO  Exam: AOx4  acetaminophen     Tablet .. 975 milliGRAM(s) Oral every 6 hours  fentaNYL    Injectable 25 MICROGram(s) IV Push every 5 minutes PRN Moderate Pain (4 - 6)  fentaNYL    Injectable 50 MICROGram(s) IV Push every 5 minutes PRN Severe Pain (7 - 10)  ondansetron Injectable 4 milliGRAM(s) IV Push once PRN Nausea and/or Vomiting      RESPIRATORY  Mechanical Ventilation:   ABG - ( 03 Apr 2025 11:42 )  pH: 7.32  /  pCO2: 39    /  pO2: 239   / HCO3: 20    / Base Excess: -5.6  /  SaO2: 97.5    Lactate: x                Exam:      CARDIOVASCULAR    Exam: normal S1/S2 w/o murmurs or rubs  Cardiac Rhythm: sinus  hydrALAZINE 50 milliGRAM(s) Oral three times a day  metoprolol tartrate 50 milliGRAM(s) Oral two times a day  niCARdipine Infusion 5 mG/Hr IV Continuous <Continuous>  NIFEdipine XL 90 milliGRAM(s) Oral daily      GI/NUTRITION  Exam: abd non distended, non TTP  Diet: regular      GENITOURINARY/RENAL  calcium gluconate IVPB 2 Gram(s) IV Intermittent once  dextrose 5%. 1000 milliLiter(s) IV Continuous <Continuous>  dextrose 5%. 1000 milliLiter(s) IV Continuous <Continuous>  sodium chloride 0.9%. 1000 milliLiter(s) IV Continuous <Continuous>      04-03 @ 07:01  -  04-04 @ 01:13  --------------------------------------------------------  IN:    NiCARdipine: 187.5 mL    Oral Fluid: 150 mL    sodium chloride 0.9%: 600 mL  Total IN: 937.5 mL    OUT:    Bulb (mL): 30 mL    Intermittent Catheterization - Urethral (mL): 1200 mL    Voided (mL): 0 mL  Total OUT: 1230 mL    Total NET: -292.5 mL                [ ] Mckeon catheter, indication: urine output monitoring in critically ill patient    HEMATOLOGIC  [ ] VTE Prophylaxis:  aspirin enteric coated 81 milliGRAM(s) Oral daily  clopidogrel Tablet 75 milliGRAM(s) Oral daily  heparin   Injectable 5000 Unit(s) SubCutaneous every 8 hours        Transfusion: [ ] PRBC	[ ] Platelets	[ ] FFP	[ ] Cryoprecipitate      INFECTIOUS DISEASES    RECENT CULTURES:      ENDOCRINE  dextrose 50% Injectable 25 Gram(s) IV Push once  dextrose 50% Injectable 12.5 Gram(s) IV Push once  dextrose 50% Injectable 25 Gram(s) IV Push once  dextrose Oral Gel 15 Gram(s) Oral once PRN  glucagon  Injectable 1 milliGRAM(s) IntraMuscular once  insulin glargine Injectable (LANTUS) 10 Unit(s) SubCutaneous at bedtime  insulin lispro (ADMELOG) corrective regimen sliding scale   SubCutaneous every 6 hours  levothyroxine 50 MICROGram(s) Oral daily    CAPILLARY BLOOD GLUCOSE      POCT Blood Glucose.: 243 mg/dL (03 Apr 2025 23:29)  POCT Blood Glucose.: 256 mg/dL (03 Apr 2025 22:08)  POCT Blood Glucose.: 275 mg/dL (03 Apr 2025 14:33)  POCT Blood Glucose.: 205 mg/dL (03 Apr 2025 09:21)      PATIENT CARE ACCESS DEVICES:  [ ] Peripheral IV  [ ] Central Venous Line	[ ] R	[ ] L	[ ] IJ	[ ] Fem	[ ] SC	Placed:   [ ] Arterial Line		[ ] R	[ ] L	[ ] Fem	[ ] Rad	[ ] Ax	Placed:   [ ] PICC:					[ ] Mediport  [ ] Urinary Catheter, Date Placed:   [x] Necessity of urinary, arterial, and venous catheters discussed    OTHER MEDICATIONS:     IMAGING STUDIES:

## 2025-04-04 NOTE — DISCHARGE NOTE NURSING/CASE MANAGEMENT/SOCIAL WORK - NSDCVIVACCINE_GEN_ALL_CORE_FT
Tdap; 28-Jan-2018 19:58; Luis Fitzgerald (RN); [employer]; [id number]; IntraMuscular; Deltoid Right.; 0.5 milliLiter(s); VIS (VIS Published: 09-May-2013, VIS Presented: 28-Jan-2018);

## 2025-04-04 NOTE — PROGRESS NOTE ADULT - ASSESSMENT
70 y/o M with PMHx significant for CAD, HTN, HLD, Insulin Dependant DM,   CVA (Left [place]ine, 2022) reports experiencing symptoms of fatigue and intermittent dizziness. He was found to have significant Right Carotid Artery Stenosis on Doppler. Patient is scheduled for Right Carotid Endarterectomy with EEG with Dr. Vargas on 04/03/2025.    PLAN:  - now off nicardipine gtt  - start PO BP meds   - TOV @ 8am, straight cath and more more TOV if fails, emerson   - dc CHRISTIANE grain  - appreciate nephro recs, restart home dose of diuretics    - neuro checks    - Diet: Regular   - Analgesia and antiemetics as needed  - Incentive spirometry  - OOB as tolerated  - DVT prophylaxis: SCD, aspirin/plavix, SQH   - Monitor overnight  - Dispo: SICU     Vascular Surgery   [id number]   
The original prescription was discontinued on 6/17/2022 by Odell Soliz MD for the following reason: Stop Taking at Discharge. Renewing this prescription may not be appropriate.  
69 year old male with PMH of CKD stage 5, CAD, HTN, HLD, DM, and history of CVA who presents to the hospital for scheduled right carotid endarterectomy today. The nephrology team was consulted for management of CKD.    CKD stage 5   kidney disase in setting of DM/HTN   Nephrologist: DR. Abdul; last seen in office 3/31/25   baseline kidney function with SCr ~4.5mg/dL   Creatinine Trend: 3.97 <--    plan   labs reviewed and volume status is okay   SCr stable at this time   no urgent need for RRT at this time   recommend stopping IVF at this time  restart home dose of diuretics; PO lasix 60mg AM and 40mg PM   Monitor labs and urine output. Dose medications as per eGFR.    HTN   BP above goal given recent endarterectomy   currently on nicardipine gtt   Nifedipine 90mg daily   stopping IVF and restarting diuretics will likely also aid in BP control   monitor BP     If any questions, please feel free to contact me     Silvino Cedeno  Nephrology Attending  Cell #[phone]

## 2025-04-04 NOTE — DISCHARGE NOTE NURSING/CASE MANAGEMENT/SOCIAL WORK - NSDCPEFALRISK_GEN_ALL_CORE
For information on Fall & Injury Prevention, visit: [email or web address] OR  [email or web address] OR  https://www.cdc.gov/steadi/patient.html

## 2025-04-04 NOTE — DISCHARGE NOTE PROVIDER - NSDCFUADDINST_GEN_ALL_CORE_FT
Wound Care: Your incision is covered w/ steri strips. Okay to shower and get wet. Allow soapy water to run through, pat dry, do not scrub. Steri strips will fall off on their own or be removed in the office at follow-up.

## 2025-04-04 NOTE — DISCHARGE NOTE PROVIDER - NSDCCPCAREPLAN_GEN_ALL_CORE_FT
PRINCIPAL DISCHARGE DIAGNOSIS  Diagnosis: Stenosis of right carotid artery  Assessment and Plan of Treatment: s/p R CEA 4/3/2025

## 2025-04-04 NOTE — DISCHARGE NOTE NURSING/CASE MANAGEMENT/SOCIAL WORK - FINANCIAL ASSISTANCE
[facility] provides services at a reduced cost to those who are determined to be eligible through [facility]’s financial assistance program. Information regarding [facility]’s financial assistance program can be found by going to [email or web address] or by calling [phone].

## 2025-04-04 NOTE — DISCHARGE NOTE PROVIDER - HOSPITAL COURSE
70 y/o M w/ PMHx significant for CAD, HTN, HLD, IDDM, CVA (Left [place]ine, 2022) reports experiencing symptoms of fatigue and intermittent dizziness. He was found to have significant right carotid artery stenosis and presented for scheduled right carotid endarterectomy.     Patient is s/p right carotid endarterectomy with drain placement on 4/3/25. Post-operatively, patient recovered in PACU was admitted to SICU overnight on a Cardene drip to maintain SBP between 110-160 post-op. On POD#1, patient was weaned off the cardene ggt, remained neuro intact. CHRISTIANE drain was removed without issues.    Patient seen by nephrology given CKD 5, deemed stabled for discharge, SCr stable.     At the time of discharge, the patient was hemodynamically stable, was tolerating PO diet, was voiding urine and passing stool, was ambulating, and was comfortable with adequate pain control. The patient was instructed to follow up with Dr. Vargas within 2 weeks after discharge from the hospital. The patient felt comfortable with discharge. The patient was discharged to home. The patient had no other issues.   70 y/o M w/ PMHx significant for CAD, HTN, HLD, IDDM, CVA (Left [place]ine, 2022) reports experiencing symptoms of fatigue and intermittent dizziness. He was found to have significant right carotid artery stenosis and presented for scheduled right carotid endarterectomy.     Patient is s/p right carotid endarterectomy with drain placement on 4/3/25. Post-operatively, patient recovered in PACU was admitted to SICU overnight on a Cardene drip to maintain SBP between 110-160 post-op. Overnight, patient with urinary retention, underwent straight catheterization x1. *****Patient was subsequently able to void on his own/Patient subsequently required emerson catheter placement and will follow-up with urology outpatient for evaluation.***** On POD#1, patient was weaned off the cardene ggt, remained neuro intact. CHRISTIANE drain was removed without issues.     Patient seen by nephrology given CKD 5, deemed stabled for discharge, SCr stable.     At the time of discharge, the patient was hemodynamically stable, was tolerating PO diet, was ambulating, and was comfortable with adequate pain control. The patient was instructed to follow up with Dr. Vargas within 2 weeks after discharge from the hospital. The patient felt comfortable with discharge. The patient was discharged to home. The patient had no other issues.   70 y/o M w/ PMHx significant for CAD, HTN, HLD, IDDM, CVA (Left [place]ine, 2022) reports experiencing symptoms of fatigue and intermittent dizziness. He was found to have significant right carotid artery stenosis and presented for scheduled right carotid endarterectomy.     Patient is s/p right carotid endarterectomy with drain placement on 4/3/25. Post-operatively, patient recovered in PACU was admitted to SICU overnight on a Cardene drip to maintain SBP between 110-160 post-op. Overnight, patient with urinary retention, underwent straight catheterization x2. Patient was subsequently able to void on his own without issues. On POD#1, patient was weaned off the cardene ggt, downgraded from the ICU, remained neuro intact. CHRISTIANE drain was removed without issues.     Patient seen by nephrology given CKD 5, deemed stabled for discharge, SCr stable.     At the time of discharge, the patient was hemodynamically stable, was tolerating PO diet, was ambulating, and was comfortable with adequate pain control. The patient was instructed to follow up with Dr. Vargas within 2 weeks after discharge from the hospital. The patient felt comfortable with discharge. The patient was discharged to home. The patient had no other issues.

## 2025-04-04 NOTE — DISCHARGE NOTE PROVIDER - CARE PROVIDER_API CALL
Caio Vargas  Vascular Surgery  [address], [address]  [place] [address]  Phone: [phone]  Fax: [phone]  Follow Up Time: 2 weeks

## 2025-04-04 NOTE — DISCHARGE NOTE PROVIDER - NSDCMRMEDTOKEN_GEN_ALL_CORE_FT
aspirin 81 mg oral delayed release tablet: 1 tab(s) orally once a day  Basaglar KwikPen 100 units/mL subcutaneous solution: 20 unit(s) subcutaneous once a day (at bedtime)  furosemide 40 mg oral tablet: 1 tab(s) orally once a day (at bedtime)  hydrALAZINE 50 mg oral tablet: 1 tab(s) orally 3 times a day  levothyroxine 50 mcg (0.05 mg) oral tablet: 1 tab(s) orally once a day  losartan 100 mg oral tablet: 1 tab(s) orally once a day  metoprolol tartrate 100 mg oral tablet: 1 tab(s) orally 2 times a day  NIFEdipine (Eqv-Procardia XL) 90 mg oral tablet, extended release: 1 tab(s) orally once a day  NovoLOG FlexTouch 100 units/mL subcutaneous solution: 8 unit(s) subcutaneous 2 times a day  oxyCODONE 5 mg oral tablet: 1 tab(s) orally every 6 hours as needed for  severe pain MDD: 4 tablets  Plavix 75 mg oral tablet: 1 tab(s) orally once a day

## 2025-04-07 LAB — SURGICAL PATHOLOGY STUDY: SIGNIFICANT CHANGE UP

## 2025-04-14 ENCOUNTER — APPOINTMENT (OUTPATIENT)
Dept: VASCULAR SURGERY | Facility: CLINIC | Age: 70
End: 2025-04-14
Payer: MEDICARE

## 2025-04-14 DIAGNOSIS — I65.23 OCCLUSION AND STENOSIS OF BILATERAL CAROTID ARTERIES: ICD-10-CM

## 2025-04-14 DIAGNOSIS — Z98.890 OTHER SPECIFIED POSTPROCEDURAL STATES: ICD-10-CM

## 2025-04-14 PROBLEM — I63.9 CEREBRAL INFARCTION, UNSPECIFIED: Chronic | Status: ACTIVE | Noted: 2025-03-19

## 2025-04-14 PROBLEM — E03.9 HYPOTHYROIDISM, UNSPECIFIED: Chronic | Status: ACTIVE | Noted: 2025-03-19

## 2025-04-14 PROBLEM — E11.9 TYPE 2 DIABETES MELLITUS WITHOUT COMPLICATIONS: Chronic | Status: ACTIVE | Noted: 2025-03-19

## 2025-04-14 PROCEDURE — 99024 POSTOP FOLLOW-UP VISIT: CPT

## 2025-05-12 ENCOUNTER — APPOINTMENT (OUTPATIENT)
Dept: VASCULAR SURGERY | Facility: CLINIC | Age: 70
End: 2025-05-12
Payer: MEDICARE

## 2025-05-12 PROCEDURE — 93880 EXTRACRANIAL BILAT STUDY: CPT

## 2025-05-12 PROCEDURE — 99024 POSTOP FOLLOW-UP VISIT: CPT

## (undated) DEVICE — DRAPE IOBAN 13" X 13"

## (undated) DEVICE — DRAIN RESERVOIR FOR JACKSON PRATT 100CC CARDINAL

## (undated) DEVICE — VISITEC 4X4

## (undated) DEVICE — SUT SOFSILK 0 18" TIES

## (undated) DEVICE — SOL IRR POUR NS 0.9% 500ML

## (undated) DEVICE — POSITIONER FOAM EGG CRATE ULNAR 2PCS (PINK)

## (undated) DEVICE — VENODYNE/SCD SLEEVE CALF MEDIUM

## (undated) DEVICE — WARMING BLANKET LOWER ADULT

## (undated) DEVICE — SUT PROLENE 6-0 4-30" BV-1

## (undated) DEVICE — SUT POLYSORB 2-0 30" GS-21 UNDYED

## (undated) DEVICE — BLADE SCALPEL SAFETYLOCK #15

## (undated) DEVICE — SUCTION YANKAUER NO CONTROL VENT

## (undated) DEVICE — PACK CAROTID ENDARTERECTOMY

## (undated) DEVICE — PREP CHLORAPREP HI-LITE ORANGE 3ML

## (undated) DEVICE — NDL HYPO REGULAR BEVEL 25G X 1.5" (BLUE)

## (undated) DEVICE — MEDICATION LABELS W MARKER

## (undated) DEVICE — SUT SOFSILK 4-0 18" TIES

## (undated) DEVICE — MARKING PEN W RULER

## (undated) DEVICE — DRSG TEGADERM 6 X 8"

## (undated) DEVICE — GOWN XL

## (undated) DEVICE — SUT SOFSILK 3-0 30" TIES

## (undated) DEVICE — SUT SOFSILK 2-0 18" TIES

## (undated) DEVICE — DRAPE TOWEL BLUE 17" X 24"

## (undated) DEVICE — DRAIN JACKSON PRATT 7MM FLAT FULL NO TROCAR

## (undated) DEVICE — SUT BIOSYN 4-0 18" P-12

## (undated) DEVICE — SOL IRR POUR H2O 250ML

## (undated) DEVICE — GLV 7.5 PROTEXIS (WHITE)

## (undated) DEVICE — DRSG STERISTRIPS 0.5 X 4"

## (undated) DEVICE — DRAPE MAYO STAND 30"

## (undated) DEVICE — DRSG OPSITE 2.5 X 2"

## (undated) DEVICE — FOLEY TRAY 16FR 5CC LTX UMETER CLOSED

## (undated) DEVICE — BLADE SCALPEL SAFETYLOCK #11

## (undated) DEVICE — DRAPE INSTRUMENT POUCH 6.75" X 11"

## (undated) DEVICE — SPECIMEN CONTAINER 100ML

## (undated) DEVICE — SOL IRR BAG NS 0.9% 1000ML

## (undated) DEVICE — LAP PAD 18 X 18"